# Patient Record
Sex: FEMALE | Race: WHITE | NOT HISPANIC OR LATINO | Employment: UNEMPLOYED | URBAN - METROPOLITAN AREA
[De-identification: names, ages, dates, MRNs, and addresses within clinical notes are randomized per-mention and may not be internally consistent; named-entity substitution may affect disease eponyms.]

---

## 2017-03-01 ENCOUNTER — AMBULATORY - HEALTHEAST (OUTPATIENT)
Dept: BEHAVIORAL HEALTH | Facility: CLINIC | Age: 42
End: 2017-03-01

## 2017-03-01 DIAGNOSIS — F32.89 DEPRESSIVE DISORDER, NOT ELSEWHERE CLASSIFIED: ICD-10-CM

## 2017-03-01 DIAGNOSIS — Z87.898 HISTORY OF DEVELOPMENTAL DELAY: ICD-10-CM

## 2017-03-13 ENCOUNTER — OFFICE VISIT - HEALTHEAST (OUTPATIENT)
Dept: BEHAVIORAL HEALTH | Facility: CLINIC | Age: 42
End: 2017-03-13

## 2017-03-13 DIAGNOSIS — R69 DIAGNOSIS DEFERRED: ICD-10-CM

## 2017-03-14 ENCOUNTER — COMMUNICATION - HEALTHEAST (OUTPATIENT)
Dept: BEHAVIORAL HEALTH | Facility: CLINIC | Age: 42
End: 2017-03-14

## 2017-03-17 ENCOUNTER — ANESTHESIA (OUTPATIENT)
Dept: SURGERY | Facility: CLINIC | Age: 42
End: 2017-03-17
Payer: MEDICARE

## 2017-03-17 ENCOUNTER — ANESTHESIA EVENT (OUTPATIENT)
Dept: SURGERY | Facility: CLINIC | Age: 42
End: 2017-03-17
Payer: MEDICARE

## 2017-03-17 ENCOUNTER — HOSPITAL ENCOUNTER (OUTPATIENT)
Facility: CLINIC | Age: 42
LOS: 1 days | Discharge: HOME | End: 2017-03-18
Attending: SURGERY | Admitting: SURGERY
Payer: MEDICARE

## 2017-03-17 DIAGNOSIS — K35.80 ACUTE APPENDICITIS, UNSPECIFIED ACUTE APPENDICITIS TYPE: Primary | ICD-10-CM

## 2017-03-17 LAB — HCG SERPL QL: NEGATIVE

## 2017-03-17 PROCEDURE — 88304 TISSUE EXAM BY PATHOLOGIST: CPT | Performed by: SURGERY

## 2017-03-17 PROCEDURE — 44970 LAPAROSCOPY APPENDECTOMY: CPT | Performed by: SURGERY

## 2017-03-17 PROCEDURE — 71000013 ZZH RECOVERY PHASE 1 LEVEL 1 EA ADDTL HR: Performed by: SURGERY

## 2017-03-17 PROCEDURE — 12000000 ZZH R&B MED SURG/OB

## 2017-03-17 PROCEDURE — 25000128 H RX IP 250 OP 636: Performed by: SURGERY

## 2017-03-17 PROCEDURE — 84703 CHORIONIC GONADOTROPIN ASSAY: CPT | Performed by: ANESTHESIOLOGY

## 2017-03-17 PROCEDURE — 25000128 H RX IP 250 OP 636: Performed by: NURSE ANESTHETIST, CERTIFIED REGISTERED

## 2017-03-17 PROCEDURE — 25000125 ZZHC RX 250: Performed by: NURSE ANESTHETIST, CERTIFIED REGISTERED

## 2017-03-17 PROCEDURE — 36000056 ZZH SURGERY LEVEL 3 1ST 30 MIN: Performed by: SURGERY

## 2017-03-17 PROCEDURE — 37000009 ZZH ANESTHESIA TECHNICAL FEE, EACH ADDTL 15 MIN: Performed by: SURGERY

## 2017-03-17 PROCEDURE — 25800025 ZZH RX 258: Performed by: NURSE ANESTHETIST, CERTIFIED REGISTERED

## 2017-03-17 PROCEDURE — 37000008 ZZH ANESTHESIA TECHNICAL FEE, 1ST 30 MIN: Performed by: SURGERY

## 2017-03-17 PROCEDURE — 25000125 ZZHC RX 250: Performed by: ANESTHESIOLOGY

## 2017-03-17 PROCEDURE — 27210794 ZZH OR GENERAL SUPPLY STERILE: Performed by: SURGERY

## 2017-03-17 PROCEDURE — 25800025 ZZH RX 258: Performed by: SURGERY

## 2017-03-17 PROCEDURE — 71000012 ZZH RECOVERY PHASE 1 LEVEL 1 FIRST HR: Performed by: SURGERY

## 2017-03-17 PROCEDURE — 36000058 ZZH SURGERY LEVEL 3 EA 15 ADDTL MIN: Performed by: SURGERY

## 2017-03-17 PROCEDURE — 99204 OFFICE O/P NEW MOD 45 MIN: CPT | Mod: 57 | Performed by: SURGERY

## 2017-03-17 PROCEDURE — 40000305 ZZH STATISTIC PRE PROC ASSESS I: Performed by: SURGERY

## 2017-03-17 PROCEDURE — 25000125 ZZHC RX 250: Performed by: SURGERY

## 2017-03-17 PROCEDURE — 25000566 ZZH SEVOFLURANE, EA 15 MIN: Performed by: SURGERY

## 2017-03-17 PROCEDURE — 88304 TISSUE EXAM BY PATHOLOGIST: CPT | Mod: 26 | Performed by: SURGERY

## 2017-03-17 PROCEDURE — 36415 COLL VENOUS BLD VENIPUNCTURE: CPT | Performed by: ANESTHESIOLOGY

## 2017-03-17 RX ORDER — PROPOFOL 10 MG/ML
INJECTION, EMULSION INTRAVENOUS PRN
Status: DISCONTINUED | OUTPATIENT
Start: 2017-03-17 | End: 2017-03-17

## 2017-03-17 RX ORDER — ERTAPENEM 1 G/1
1 INJECTION, POWDER, LYOPHILIZED, FOR SOLUTION INTRAMUSCULAR; INTRAVENOUS EVERY 24 HOURS
Status: DISCONTINUED | OUTPATIENT
Start: 2017-03-17 | End: 2017-03-17 | Stop reason: HOSPADM

## 2017-03-17 RX ORDER — NALOXONE HYDROCHLORIDE 0.4 MG/ML
.1-.4 INJECTION, SOLUTION INTRAMUSCULAR; INTRAVENOUS; SUBCUTANEOUS
Status: DISCONTINUED | OUTPATIENT
Start: 2017-03-17 | End: 2017-03-18

## 2017-03-17 RX ORDER — PRENATAL VIT/IRON FUM/FOLIC AC 27MG-0.8MG
1 TABLET ORAL DAILY
COMMUNITY

## 2017-03-17 RX ORDER — LIDOCAINE 40 MG/G
CREAM TOPICAL
Status: DISCONTINUED | OUTPATIENT
Start: 2017-03-17 | End: 2017-03-17 | Stop reason: HOSPADM

## 2017-03-17 RX ORDER — GLYCOPYRROLATE 0.2 MG/ML
INJECTION, SOLUTION INTRAMUSCULAR; INTRAVENOUS PRN
Status: DISCONTINUED | OUTPATIENT
Start: 2017-03-17 | End: 2017-03-17

## 2017-03-17 RX ORDER — SODIUM CHLORIDE, SODIUM LACTATE, POTASSIUM CHLORIDE, CALCIUM CHLORIDE 600; 310; 30; 20 MG/100ML; MG/100ML; MG/100ML; MG/100ML
INJECTION, SOLUTION INTRAVENOUS CONTINUOUS
Status: DISCONTINUED | OUTPATIENT
Start: 2017-03-17 | End: 2017-03-17 | Stop reason: HOSPADM

## 2017-03-17 RX ORDER — LIDOCAINE HYDROCHLORIDE 10 MG/ML
INJECTION, SOLUTION INFILTRATION; PERINEURAL PRN
Status: DISCONTINUED | OUTPATIENT
Start: 2017-03-17 | End: 2017-03-17

## 2017-03-17 RX ORDER — DEXAMETHASONE SODIUM PHOSPHATE 4 MG/ML
INJECTION, SOLUTION INTRA-ARTICULAR; INTRALESIONAL; INTRAMUSCULAR; INTRAVENOUS; SOFT TISSUE PRN
Status: DISCONTINUED | OUTPATIENT
Start: 2017-03-17 | End: 2017-03-17

## 2017-03-17 RX ORDER — NEOSTIGMINE METHYLSULFATE 1 MG/ML
VIAL (ML) INJECTION PRN
Status: DISCONTINUED | OUTPATIENT
Start: 2017-03-17 | End: 2017-03-17

## 2017-03-17 RX ORDER — FENTANYL CITRATE 50 UG/ML
INJECTION, SOLUTION INTRAMUSCULAR; INTRAVENOUS PRN
Status: DISCONTINUED | OUTPATIENT
Start: 2017-03-17 | End: 2017-03-17

## 2017-03-17 RX ORDER — SODIUM CHLORIDE, SODIUM LACTATE, POTASSIUM CHLORIDE, CALCIUM CHLORIDE 600; 310; 30; 20 MG/100ML; MG/100ML; MG/100ML; MG/100ML
INJECTION, SOLUTION INTRAVENOUS CONTINUOUS
Status: DISCONTINUED | OUTPATIENT
Start: 2017-03-17 | End: 2017-03-18 | Stop reason: HOSPADM

## 2017-03-17 RX ORDER — DROPERIDOL 2.5 MG/ML
0.62 INJECTION, SOLUTION INTRAMUSCULAR; INTRAVENOUS
Status: DISCONTINUED | OUTPATIENT
Start: 2017-03-17 | End: 2017-03-17 | Stop reason: RX

## 2017-03-17 RX ORDER — CHLORHEXIDINE GLUCONATE ORAL RINSE 1.2 MG/ML
15 SOLUTION DENTAL 2 TIMES DAILY
COMMUNITY

## 2017-03-17 RX ORDER — MEPERIDINE HYDROCHLORIDE 25 MG/ML
12.5 INJECTION INTRAMUSCULAR; INTRAVENOUS; SUBCUTANEOUS EVERY 5 MIN PRN
Status: DISCONTINUED | OUTPATIENT
Start: 2017-03-17 | End: 2017-03-18 | Stop reason: HOSPADM

## 2017-03-17 RX ORDER — DIMENHYDRINATE 50 MG/ML
25 INJECTION, SOLUTION INTRAMUSCULAR; INTRAVENOUS
Status: DISCONTINUED | OUTPATIENT
Start: 2017-03-17 | End: 2017-03-18 | Stop reason: HOSPADM

## 2017-03-17 RX ORDER — DEXAMETHASONE SODIUM PHOSPHATE 4 MG/ML
4 INJECTION, SOLUTION INTRA-ARTICULAR; INTRALESIONAL; INTRAMUSCULAR; INTRAVENOUS; SOFT TISSUE
Status: DISCONTINUED | OUTPATIENT
Start: 2017-03-17 | End: 2017-03-18 | Stop reason: HOSPADM

## 2017-03-17 RX ORDER — BUPIVACAINE HYDROCHLORIDE AND EPINEPHRINE 5; 5 MG/ML; UG/ML
INJECTION, SOLUTION PERINEURAL PRN
Status: DISCONTINUED | OUTPATIENT
Start: 2017-03-17 | End: 2017-03-18 | Stop reason: HOSPADM

## 2017-03-17 RX ORDER — FENTANYL CITRATE 50 UG/ML
25-50 INJECTION, SOLUTION INTRAMUSCULAR; INTRAVENOUS
Status: DISCONTINUED | OUTPATIENT
Start: 2017-03-17 | End: 2017-03-18 | Stop reason: HOSPADM

## 2017-03-17 RX ORDER — KETOROLAC TROMETHAMINE 30 MG/ML
30 INJECTION, SOLUTION INTRAMUSCULAR; INTRAVENOUS
Status: DISCONTINUED | OUTPATIENT
Start: 2017-03-17 | End: 2017-03-18 | Stop reason: HOSPADM

## 2017-03-17 RX ORDER — ONDANSETRON 2 MG/ML
4 INJECTION INTRAMUSCULAR; INTRAVENOUS EVERY 30 MIN PRN
Status: DISCONTINUED | OUTPATIENT
Start: 2017-03-17 | End: 2017-03-18 | Stop reason: HOSPADM

## 2017-03-17 RX ORDER — METOCLOPRAMIDE HYDROCHLORIDE 5 MG/ML
10 INJECTION INTRAMUSCULAR; INTRAVENOUS EVERY 6 HOURS PRN
Status: DISCONTINUED | OUTPATIENT
Start: 2017-03-17 | End: 2017-03-18 | Stop reason: HOSPADM

## 2017-03-17 RX ORDER — SODIUM CHLORIDE, SODIUM LACTATE, POTASSIUM CHLORIDE, CALCIUM CHLORIDE 600; 310; 30; 20 MG/100ML; MG/100ML; MG/100ML; MG/100ML
INJECTION, SOLUTION INTRAVENOUS CONTINUOUS PRN
Status: DISCONTINUED | OUTPATIENT
Start: 2017-03-17 | End: 2017-03-17

## 2017-03-17 RX ORDER — HYDROMORPHONE HYDROCHLORIDE 1 MG/ML
.3-.5 INJECTION, SOLUTION INTRAMUSCULAR; INTRAVENOUS; SUBCUTANEOUS EVERY 5 MIN PRN
Status: DISCONTINUED | OUTPATIENT
Start: 2017-03-17 | End: 2017-03-18 | Stop reason: HOSPADM

## 2017-03-17 RX ORDER — METOCLOPRAMIDE 10 MG/1
10 TABLET ORAL EVERY 6 HOURS PRN
Status: DISCONTINUED | OUTPATIENT
Start: 2017-03-17 | End: 2017-03-18 | Stop reason: HOSPADM

## 2017-03-17 RX ORDER — HYDRALAZINE HYDROCHLORIDE 20 MG/ML
2.5-5 INJECTION INTRAMUSCULAR; INTRAVENOUS EVERY 10 MIN PRN
Status: DISCONTINUED | OUTPATIENT
Start: 2017-03-17 | End: 2017-03-18 | Stop reason: HOSPADM

## 2017-03-17 RX ORDER — ONDANSETRON 2 MG/ML
INJECTION INTRAMUSCULAR; INTRAVENOUS PRN
Status: DISCONTINUED | OUTPATIENT
Start: 2017-03-17 | End: 2017-03-17

## 2017-03-17 RX ORDER — LABETALOL HYDROCHLORIDE 5 MG/ML
INJECTION, SOLUTION INTRAVENOUS PRN
Status: DISCONTINUED | OUTPATIENT
Start: 2017-03-17 | End: 2017-03-17

## 2017-03-17 RX ORDER — ONDANSETRON 4 MG/1
4 TABLET, ORALLY DISINTEGRATING ORAL EVERY 30 MIN PRN
Status: DISCONTINUED | OUTPATIENT
Start: 2017-03-17 | End: 2017-03-18 | Stop reason: HOSPADM

## 2017-03-17 RX ADMIN — Medication 3 MG: at 22:33

## 2017-03-17 RX ADMIN — SODIUM CHLORIDE, POTASSIUM CHLORIDE, SODIUM LACTATE AND CALCIUM CHLORIDE: 600; 310; 30; 20 INJECTION, SOLUTION INTRAVENOUS at 21:40

## 2017-03-17 RX ADMIN — ROCURONIUM BROMIDE 50 MG: 10 INJECTION INTRAVENOUS at 21:45

## 2017-03-17 RX ADMIN — PROPOFOL 200 MG: 10 INJECTION, EMULSION INTRAVENOUS at 21:43

## 2017-03-17 RX ADMIN — GLYCOPYRROLATE 0.6 MG: 0.2 INJECTION, SOLUTION INTRAMUSCULAR; INTRAVENOUS at 22:33

## 2017-03-17 RX ADMIN — GLYCOPYRROLATE 0.2 MG: 0.2 INJECTION, SOLUTION INTRAMUSCULAR; INTRAVENOUS at 21:42

## 2017-03-17 RX ADMIN — LABETALOL HYDROCHLORIDE 7.5 MG: 5 INJECTION, SOLUTION INTRAVENOUS at 22:12

## 2017-03-17 RX ADMIN — ERTAPENEM SODIUM 1 G: 1 INJECTION, POWDER, LYOPHILIZED, FOR SOLUTION INTRAMUSCULAR; INTRAVENOUS at 21:15

## 2017-03-17 RX ADMIN — FENTANYL CITRATE 50 MCG: 50 INJECTION INTRAMUSCULAR; INTRAVENOUS at 23:48

## 2017-03-17 RX ADMIN — SODIUM CHLORIDE, POTASSIUM CHLORIDE, SODIUM LACTATE AND CALCIUM CHLORIDE: 600; 310; 30; 20 INJECTION, SOLUTION INTRAVENOUS at 22:15

## 2017-03-17 RX ADMIN — FENTANYL CITRATE 50 MCG: 50 INJECTION INTRAMUSCULAR; INTRAVENOUS at 23:38

## 2017-03-17 RX ADMIN — ONDANSETRON 4 MG: 2 INJECTION INTRAMUSCULAR; INTRAVENOUS at 21:58

## 2017-03-17 RX ADMIN — DEXAMETHASONE SODIUM PHOSPHATE 4 MG: 4 INJECTION, SOLUTION INTRAMUSCULAR; INTRAVENOUS at 21:46

## 2017-03-17 RX ADMIN — LIDOCAINE HYDROCHLORIDE 50 MG: 10 INJECTION, SOLUTION INFILTRATION; PERINEURAL at 21:43

## 2017-03-17 RX ADMIN — FENTANYL CITRATE 100 MCG: 50 INJECTION, SOLUTION INTRAMUSCULAR; INTRAVENOUS at 21:41

## 2017-03-17 RX ADMIN — FENTANYL CITRATE 50 MCG: 50 INJECTION INTRAMUSCULAR; INTRAVENOUS at 21:39

## 2017-03-17 NOTE — IP AVS SNAPSHOT
MRN:[id number]                      After Visit Summary   3/17/2017    Abelino Choi    MRN: [id number]           Thank you!     Thank you for choosing [facility] for your care. Our goal is always to provide you with excellent care. Hearing back from our patients is one way we can continue to improve our services. Please take a few minutes to complete the written survey that you may receive in the mail after you visit. If you would like to speak to someone directly about your visit please contact Patient Relations at [phone]. Thank you!          Patient Information     Date Of Birth          1975        About your hospital stay     You were admitted on:  March 17, 2017 You last received care in the:  [facility]    You were discharged on:  March 18, 2017       Who to Call     For medical emergencies, please call 911.  For non-urgent questions about your medical care, please call your primary care provider or clinic, None  For questions related to your surgery, please call your surgery clinic        Attending Provider     Provider Samson Hearn MD Surgery       Primary Care Provider    Physician No Ref-Primary       No address on file        After Care Instructions     Ice to affected area       Ice to operative site PRN                  Further instructions from your care team       HOME CARE FOLLOWING APPENDECTOMY  DAVID Gifford, ISAAC Woodward, CATALINO Herndon, BECKI Avilez    INCISIONAL CARE:  Replace the bandage over your incision (or incisions) until all drainage stops, or if more comfortable to have in place.  If present, leave the steri-strips (white paper tapes) in place till they fall off.  If you have staples in your incision at the time of discharge, they will be removed at your follow-up appointment.  If Dermabond (a type of skin glue) is present, leave in place until it wears/flakes off.      BATHING:  Avoid baths for 1 week after surgery.  Showers are okay.  You may wash your hair at any time.  Gently pat your incision dry after bathing.    ACTIVITY:  Light Activity -- you may immediately be up and about as tolerated.  Driving -- you may drive when comfortable and off narcotic pain medications.  Light Work -- resume when comfortable off pain medications.  (If you can drive, you probably can work.)  Strenuous Work/Activity -- limit lifting to 20 pounds for 1 week.  Progressively increase with time.  Active Sports (running, biking, etc.) -- cautiously resume after 2 weeks.    DISCOMFORT:  Use pain medications as prescribed by your surgeon.  Take the pain medication with some food, when possible, to minimize side effects.  Intermittent use of ice packs at the incision sites may help during the first 48 hours.  Expect gradual improvement.    DIET:  No restrictions.  Drink plenty of fluids.  While taking pain medications, increase dietary fiber or add a fiber supplementation like Metamucil or Citrucel to help prevent constipation - a possible side effect of pain medications.    NAUSEA:  If nauseated from the anesthetic/pain meds; rest in bed, get up cautiously with assistance, and drink clear liquids (juice, tea, broth).    RETURN APPOINTMENT:  Schedule a follow-up visit 1-3 weeks post-op.  Office Phone:  [phone]     CONTACT US IF THE FOLLOWING DEVELOPS:   1. A fever that is above 101     2. If there is a large amount of drainage, bleeding, or swelling.   3. Severe pain that is not relieved by your prescription.   4. Drainage that is thick, cloudy, yellow, green or white.   5. Any other questions not answered by  Frequently Asked Questions  sheet.      FREQUENTLY ASKED QUESTIONS:    Q:  How should my incision look?    A:  Normally your incision will appear slightly swollen with light redness directly along the incision itself as it heals.  It may feel like a bump or ridge as the healing/scarring  happens, and over time (3-4 months) this bump or ridge feeling should slowly go away.  In general, clear or pink watery drainage can be normal at first as your incision heals, but should decrease over time.    Q:  How do I know if my incision is infected?  A:  Look at your incision for signs of infection, like redness around the incision spreading to surrounding skin, or drainage of cloudy or foul-smelling drainage.  If you feel warm, check your temperature to see if you are running a fever.    **If any of these things occur, please notify the nurse at our office.  We may need you to come into the office for an incision check.      Q:  How do I take care of my incision?  A:  If you have a dressing in place - Starting the day after surgery, replace the dressing 1-2 times a day until there is no further drainage from the incision.  At that time, a dressing is no longer needed.  Try to minimize tape on the skin if irritation is occurring at the tape sites.  If you have significant irritation from tape on the skin, please call the office to discuss other method of dressing your incision.    Small pieces of tape called  steri-strips  may be present directly overlying your incision; these may be removed 10 days after surgery unless otherwise specified by your surgeon.  If these tapes start to loosen at the ends, you may trim them back until they fall off or are removed.    A:  If you had  Dermabond  tissue glue used as a dressing (this causes your incision to look shiny with a clear covering over it) - This type of dressing wears off with time and does not require more dressings over the top unless it is draining around the glue as it wears off.  Do not apply ointments or lotions over the incisions until the glue has completely worn off.    Q:  There is a piece of tape or a sticky  lead  still on my skin.  Can I remove this?  A:  Sometimes the sticky  leads  used for monitoring during surgery or for evaluation in the  emergency department are not all removed while you are in the hospital.  These sometimes have a tab or metal dot on them.  You can easily remove these on your own, like taking off a band-aid.  If there is a gel substance under the  lead , simply wipe/clean it off with a washcloth or paper towel.      Q:  What can I do to minimize constipation (very hard stools, or lack of stools)?  A:  Stay well hydrated.  Increase your dietary fiber intake or take a fiber supplement -with plenty of water.  Walk around frequently.  You may consider an over-the-counter stool-softener.  Your Pharmacist can assist you with choosing one that is stocked at your pharmacy.  Constipation is also one of the most common side effects of pain medication.  If you are using pain medication, be pro-active and try to PREVENT problems with constipation by taking the steps above BEFORE constipation becomes a problem.    Q:  What do I do if I need more pain medications?  A:  Call the office to receive refills.  Be aware that certain pain meds cannot be called into a pharmacy and actually require a paper prescription.  A change may be made in your pain med as you progress thru your recovery period or if you have side effects to certain meds.    --Pain meds are NOT refilled after 5pm on weekdays, and NOT AT ALL on the weekends, so please look ahead to prevent problems.      Q:  Why am I having a hard time sleeping now that I am at home?  A:  Many medications you receive while you are in the hospital can impact your sleep for a number of days after your surgery/hospitalization.  Decreased level of activity and naps during the day may also make sleeping at night difficult.  Try to minimize day-time naps, and get up frequently during the day to walk around your home during your recovery time.  Sleep aides may be of some help, but are not recommended for long-term use.      Q:  I am having some back discomfort.  What should I do?  A:  This may be related to  certain positioning that was required for your surgery, extended periods of time in bed, or other changes in your overall activity level.  You may try ice, heat, acetaminophen, or ibuprofen to treat this temporarily.  Note that many pain medications have acetaminophen in them and would state this on the prescription bottle.  Be sure not to exceed the maximum of 4000mg per day of acetaminophen.     **If the pain you are having does not resolve, is severe, or is a flare of back pain you have had on other occasions prior to surgery, please contact your primary physician for further recommendations or for an appointment to be examined at their office.    Q:  Why am I having headaches?  A:  Headaches can be caused by many things:  caffeine withdrawal, use of pain meds, dehydration, high blood pressure, lack of sleep, over-activity/exhaustion, flare-up of usual migraine headaches.  If you feel this is related to muscle tension (a band-like feeling around the head, or a pressure at the low-back of the head) you may try ice or heat to this area.  You may need to drink more fluids (try electrolyte drink like Gatorade), rest, or take your usual migraine medications.   **If your headaches do not resolve, worsen, are accompanied by other symptoms, or if your blood pressure is high, please call your primary physician for recommendation and/or examination.    Q:  I am unable to urinate.  What do I do?  A:  A small percentage of people can have difficulty urinating initially after surgery.  This includes being able to urinate only a very small amount at a time and feeling discomfort or pressure in the very low abdomen.  This is called  urinary retention , and is actually an urgent situation.  Proceed to your nearest Emergency department for evaluation (not an Urgent Care Center).  Sometimes the bladder does not work correctly after certain medications you receive during surgery, or related to certain procedures.  You may need to  "have a catheter placed until your bladder recovers.  When planning to go to an Emergency department, it may help to call the ER to let them know you are coming in for this problem after a surgery.  This may help you get in quicker to be evaluated.  **If you have symptoms of a urinary tract infection, please contact your primary physician for the proper evaluation and treatment.          If you have other questions, please call the office Monday thru Friday between 8am and 5pm to discuss with the nurse or physician assistant.  [phone]    There is a surgeon ON CALL on weekday evenings and over the weekend in case of urgent need only, and may be contacted at the same number.    If you are having an emergency, call 911 or proceed to your nearest emergency department.            Pending Results     Date and Time Order Name Status Description    3/17/2017 2230 Surgical pathology exam In process             Statement of Approval     Ordered          03/18/17 9079  I have reviewed and agree with all the recommendations and orders detailed in this document.  EFFECTIVE NOW     Approved and electronically signed by:  Mona Thompson PA-C             Admission Information     Date & Time Provider Department Dept. Phone    3/17/2017 Samson Flaherty MD [facility] [phone]      Your Vitals Were     Blood Pressure Temperature Respirations Height Weight Last Period    105/54 (BP Location: Right arm) 98.9  F (37.2  C) (Oral) 16 1.626 m (5' 4\") 74.3 kg (163 lb 12.8 oz) 03/17/2017    Pulse Oximetry BMI (Body Mass Index)                94% 28.12 kg/m2          Locationary Information     Locationary lets you send messages to your doctor, view your test results, renew your prescriptions, schedule appointments and more. To sign up, go to [email or web address] . Click on \"Log in\" on the left side of the screen, which will take you to the Welcome page. Then click on \"Sign up Now\" on the right side of " the page.     You will be asked to enter the access code listed below, as well as some personal information. Please follow the directions to create your username and password.     Your access code is: [email or web address]  Expires: 2017  6:17 PM     Your access code will  in 90 days. If you need help or a new code, please call your [facility] clinic or [phone].        Care EveryWhere ID     This is your Care EveryWhere ID. This could be used by other organizations to access your [facility] medical records  [id number]           Review of your medicines      UNREVIEWED medicines. Ask your doctor about these medicines        Dose / Directions    calcium carb 1250 mg (500 mg [place])/vitamin D 200 units 500-200 MG-UNIT per tablet   Commonly known as:  OSCAL with D        Dose:  2 tablet   Take 2 tablets by mouth daily   Refills:  0       chlorhexidine 0.12 % solution   Commonly known as:  PERIDEX        Dose:  15 mL   Swish and spit 15 mLs in mouth 2 times daily   Refills:  0       prenatal multivitamin  plus iron 27-0.8 MG Tabs per tablet        Dose:  1 tablet   Take 1 tablet by mouth daily   Refills:  0       SERTRALINE HCL PO        Dose:  50 mg   Take 50 mg by mouth daily   Refills:  0         START taking        Dose / Directions    oxyCODONE-acetaminophen 5-325 MG per tablet   Commonly known as:  PERCOCET   Used for:  Acute appendicitis, unspecified acute appendicitis type        Dose:  1-2 tablet   Take 1-2 tablets by mouth every 4 hours as needed for pain (moderate to severe)   Quantity:  30 tablet   Refills:  0       * senna-docusate 8.6-50 MG per tablet   Commonly known as:  SENOKOT S   Used for:  Acute appendicitis, unspecified acute appendicitis type        Dose:  1 tablet   Take 1 tablet by mouth 2 times daily as needed for constipation   Quantity:  50 tablet   Refills:  0       * senna-docusate 8.6-50 MG per tablet   Commonly known as:  SENOKOT S   Used for:  Acute appendicitis, unspecified acute  appendicitis type        Dose:  1 tablet   Take 1 tablet by mouth 2 times daily as needed for constipation   Quantity:  50 tablet   Refills:  0       * Notice:  This list has 2 medication(s) that are the same as other medications prescribed for you. Read the directions carefully, and ask your doctor or other care provider to review them with you.         Where to get your medicines      These medications were sent to [place], [place] - [address] [facility]  [address] [facility] [address]     Phone:  [phone]     senna-docusate 8.6-50 MG per tablet    senna-docusate 8.6-50 MG per tablet         Some of these will need a paper prescription and others can be bought over the counter. Ask your nurse if you have questions.     Bring a paper prescription for each of these medications     oxyCODONE-acetaminophen 5-325 MG per tablet                Protect others around you: Learn how to safely use, store and throw away your medicines at www.disposemymeds.org.             Medication List: This is a list of all your medications and when to take them. Check marks below indicate your daily home schedule. Keep this list as a reference.      Medications           Morning Afternoon Evening Bedtime As Needed    calcium carb 1250 mg (500 mg [place])/vitamin D 200 units 500-200 MG-UNIT per tablet   Commonly known as:  OSCAL with D   Take 2 tablets by mouth daily   Last time this was given:  2 tablets on 3/18/2017  9:20 AM                                chlorhexidine 0.12 % solution   Commonly known as:  PERIDEX   Swish and spit 15 mLs in mouth 2 times daily   Last time this was given:  15 mLs on 3/18/2017  5:56 PM                                oxyCODONE-acetaminophen 5-325 MG per tablet   Commonly known as:  PERCOCET   Take 1-2 tablets by mouth every 4 hours as needed for pain (moderate to severe)   Last time this was given:  2 tablets on 3/18/2017  5:56 PM                                 prenatal multivitamin  plus iron 27-0.8 MG Tabs per tablet   Take 1 tablet by mouth daily   Last time this was given:  1 tablet on 3/18/2017  9:23 AM                                * senna-docusate 8.6-50 MG per tablet   Commonly known as:  SENOKOT S   Take 1 tablet by mouth 2 times daily as needed for constipation                                * senna-docusate 8.6-50 MG per tablet   Commonly known as:  SENOKOT S   Take 1 tablet by mouth 2 times daily as needed for constipation                                SERTRALINE HCL PO   Take 50 mg by mouth daily   Last time this was given:  50 mg on 3/18/2017  9:19 AM                                * Notice:  This list has 2 medication(s) that are the same as other medications prescribed for you. Read the directions carefully, and ask your doctor or other care provider to review them with you.

## 2017-03-17 NOTE — IP AVS SNAPSHOT
[facility]    [address] [facility] [address]    Phone:  [phone]    Fax:  [phone]                                       After Visit Summary   3/17/2017    Abelino Choi    MRN: [id number]           After Visit Summary Signature Page     I have received my discharge instructions, and my questions have been answered. I have discussed any challenges I see with this plan with the nurse or doctor.    ..........................................................................................................................................  Patient/Patient Representative Signature      ..........................................................................................................................................  Patient Representative Print Name and Relationship to Patient    ..................................................               ................................................  Date                                            Time    ..........................................................................................................................................  Reviewed by Signature/Title    ...................................................              ..............................................  Date                                                            Time

## 2017-03-18 VITALS
SYSTOLIC BLOOD PRESSURE: 105 MMHG | BODY MASS INDEX: 27.96 KG/M2 | OXYGEN SATURATION: 94 % | WEIGHT: 163.8 LBS | RESPIRATION RATE: 16 BRPM | DIASTOLIC BLOOD PRESSURE: 54 MMHG | HEIGHT: 64 IN | TEMPERATURE: 98.9 F

## 2017-03-18 PROBLEM — K37 APPENDICITIS: Status: ACTIVE | Noted: 2017-03-18

## 2017-03-18 PROCEDURE — 25000125 ZZHC RX 250: Performed by: SURGERY

## 2017-03-18 PROCEDURE — A9270 NON-COVERED ITEM OR SERVICE: HCPCS | Mod: GY | Performed by: SURGERY

## 2017-03-18 PROCEDURE — 25000132 ZZH RX MED GY IP 250 OP 250 PS 637: Mod: GY | Performed by: SURGERY

## 2017-03-18 PROCEDURE — 25000128 H RX IP 250 OP 636: Performed by: SURGERY

## 2017-03-18 RX ORDER — ONDANSETRON 2 MG/ML
4 INJECTION INTRAMUSCULAR; INTRAVENOUS EVERY 6 HOURS PRN
Status: DISCONTINUED | OUTPATIENT
Start: 2017-03-18 | End: 2017-03-18 | Stop reason: HOSPADM

## 2017-03-18 RX ORDER — ALBUTEROL SULFATE 90 UG/1
2 AEROSOL, METERED RESPIRATORY (INHALATION) EVERY 4 HOURS PRN
Status: DISCONTINUED | OUTPATIENT
Start: 2017-03-18 | End: 2017-03-18 | Stop reason: HOSPADM

## 2017-03-18 RX ORDER — AMOXICILLIN 250 MG
1 CAPSULE ORAL 2 TIMES DAILY PRN
Qty: 50 TABLET | Refills: 0 | Status: SHIPPED | OUTPATIENT
Start: 2017-03-18

## 2017-03-18 RX ORDER — PROCHLORPERAZINE MALEATE 5 MG
5-10 TABLET ORAL EVERY 6 HOURS PRN
Status: DISCONTINUED | OUTPATIENT
Start: 2017-03-18 | End: 2017-03-18 | Stop reason: HOSPADM

## 2017-03-18 RX ORDER — OXYCODONE AND ACETAMINOPHEN 5; 325 MG/1; MG/1
1-2 TABLET ORAL EVERY 4 HOURS PRN
Qty: 30 TABLET | Refills: 0 | Status: SHIPPED | OUTPATIENT
Start: 2017-03-18

## 2017-03-18 RX ORDER — PRENATAL VIT/IRON FUM/FOLIC AC 27MG-0.8MG
1 TABLET ORAL DAILY
Status: DISCONTINUED | OUTPATIENT
Start: 2017-03-18 | End: 2017-03-18 | Stop reason: HOSPADM

## 2017-03-18 RX ORDER — CHLORHEXIDINE GLUCONATE ORAL RINSE 1.2 MG/ML
15 SOLUTION DENTAL 2 TIMES DAILY
Status: DISCONTINUED | OUTPATIENT
Start: 2017-03-18 | End: 2017-03-18 | Stop reason: HOSPADM

## 2017-03-18 RX ORDER — ONDANSETRON 4 MG/1
4 TABLET, ORALLY DISINTEGRATING ORAL EVERY 6 HOURS PRN
Status: DISCONTINUED | OUTPATIENT
Start: 2017-03-18 | End: 2017-03-18 | Stop reason: HOSPADM

## 2017-03-18 RX ORDER — OXYCODONE AND ACETAMINOPHEN 5; 325 MG/1; MG/1
1-2 TABLET ORAL EVERY 4 HOURS PRN
Status: DISCONTINUED | OUTPATIENT
Start: 2017-03-18 | End: 2017-03-18 | Stop reason: HOSPADM

## 2017-03-18 RX ORDER — NALOXONE HYDROCHLORIDE 0.4 MG/ML
.1-.4 INJECTION, SOLUTION INTRAMUSCULAR; INTRAVENOUS; SUBCUTANEOUS
Status: DISCONTINUED | OUTPATIENT
Start: 2017-03-18 | End: 2017-03-18 | Stop reason: HOSPADM

## 2017-03-18 RX ORDER — OXYCODONE AND ACETAMINOPHEN 5; 325 MG/1; MG/1
1-2 TABLET ORAL EVERY 4 HOURS PRN
Qty: 20 TABLET | Refills: 0 | Status: SHIPPED | OUTPATIENT
Start: 2017-03-18 | End: 2017-03-18

## 2017-03-18 RX ORDER — HYDROMORPHONE HYDROCHLORIDE 1 MG/ML
.3-.5 INJECTION, SOLUTION INTRAMUSCULAR; INTRAVENOUS; SUBCUTANEOUS
Status: DISCONTINUED | OUTPATIENT
Start: 2017-03-18 | End: 2017-03-18 | Stop reason: HOSPADM

## 2017-03-18 RX ADMIN — Medication 2 LOZENGE: at 04:10

## 2017-03-18 RX ADMIN — CHLORHEXIDINE GLUCONATE 15 ML: 1.2 RINSE ORAL at 17:56

## 2017-03-18 RX ADMIN — HYDROMORPHONE HYDROCHLORIDE 0.5 MG: 1 INJECTION, SOLUTION INTRAMUSCULAR; INTRAVENOUS; SUBCUTANEOUS at 03:41

## 2017-03-18 RX ADMIN — HYDROMORPHONE HYDROCHLORIDE 0.5 MG: 1 INJECTION, SOLUTION INTRAMUSCULAR; INTRAVENOUS; SUBCUTANEOUS at 07:11

## 2017-03-18 RX ADMIN — OYSTER SHELL CALCIUM WITH VITAMIN D 2 TABLET: 500; 200 TABLET, FILM COATED ORAL at 09:20

## 2017-03-18 RX ADMIN — ALBUTEROL SULFATE 2 PUFF: 90 AEROSOL, METERED RESPIRATORY (INHALATION) at 04:48

## 2017-03-18 RX ADMIN — HYDROMORPHONE HYDROCHLORIDE 0.5 MG: 1 INJECTION, SOLUTION INTRAMUSCULAR; INTRAVENOUS; SUBCUTANEOUS at 01:25

## 2017-03-18 RX ADMIN — ONDANSETRON 4 MG: 2 INJECTION INTRAMUSCULAR; INTRAVENOUS at 01:31

## 2017-03-18 RX ADMIN — OXYCODONE HYDROCHLORIDE AND ACETAMINOPHEN 2 TABLET: 5; 325 TABLET ORAL at 09:21

## 2017-03-18 RX ADMIN — SERTRALINE HYDROCHLORIDE 50 MG: 50 TABLET, FILM COATED ORAL at 09:19

## 2017-03-18 RX ADMIN — PROCHLORPERAZINE EDISYLATE 5 MG: 5 INJECTION INTRAMUSCULAR; INTRAVENOUS at 03:54

## 2017-03-18 RX ADMIN — PRENATAL VIT W/ FE FUMARATE-FA TAB 27-0.8 MG 1 TABLET: 27-0.8 TAB at 09:23

## 2017-03-18 RX ADMIN — ONDANSETRON 4 MG: 4 TABLET, ORALLY DISINTEGRATING ORAL at 09:21

## 2017-03-18 RX ADMIN — OXYCODONE HYDROCHLORIDE AND ACETAMINOPHEN 2 TABLET: 5; 325 TABLET ORAL at 13:59

## 2017-03-18 RX ADMIN — OXYCODONE HYDROCHLORIDE AND ACETAMINOPHEN 2 TABLET: 5; 325 TABLET ORAL at 17:56

## 2017-03-18 ASSESSMENT — ACTIVITIES OF DAILY LIVING (ADL)
TOILETING: 0-->INDEPENDENT
BATHING: 0-->INDEPENDENT
FALL_HISTORY_WITHIN_LAST_SIX_MONTHS: NO
AMBULATION: 0-->INDEPENDENT
RETIRED_COMMUNICATION: 0-->UNDERSTANDS/COMMUNICATES WITHOUT DIFFICULTY
COGNITION: 0 - NO COGNITION ISSUES REPORTED
SWALLOWING: 0-->SWALLOWS FOODS/LIQUIDS WITHOUT DIFFICULTY
DRESS: 0-->INDEPENDENT
TRANSFERRING: 0-->INDEPENDENT
RETIRED_EATING: 0-->INDEPENDENT

## 2017-03-18 NOTE — BRIEF OP NOTE
[facility] Brief Operative Note    Pre-operative diagnosis: appendix   Post-operative diagnosis Acute appendicitis     Procedure: Procedure(s):  LAPAROSCOPIC APPENDECTOMY  - Wound Class: III-Contaminated   Surgeon(s): Surgeon(s) and Role:     * Samson Flaherty MD - Primary   Estimated blood loss: 5    Specimens:   ID Type Source Tests Collected by Time Destination   A : appendix Tissue Appendix SURGICAL PATHOLOGY EXAM Samson Flaherty MD 3/17/2017 10:21 PM       Findings: Acute nonruptured appendicitis and umbilical hernia which was dissected and used for trocar site.

## 2017-03-18 NOTE — PLAN OF CARE
Problem: Goal Outcome Summary  Goal: Goal Outcome Summary  Outcome: Improving  Pt is tolerating diet, pain is controlled with po meds.  Pt is voiding and does have her menses.  pts abdomen is tender.  Pt is planning to discharge on PM shift.  Her crisis home will provide a ride.

## 2017-03-18 NOTE — PLAN OF CARE
Problem: Goal Outcome Summary  Goal: Goal Outcome Summary  Outcome: No Change  Arrived to room 646 from PACU at 0014 via cart, alert and oriented x 4, oriented to room and call system, IV patent and infusing, ambulated to BR w/SBA to void, rates pain 7-8/10, reviewed welcome folder and pain/medication information packet with patient. Admission profile completed. Abdomen rounded, no bowel sounds. Steri strips/bandaids x 3- dry/intact. C/o nausea but requesting sandwiches and asking when she can order breakfast. Taking po liquids well. Pt taking IV Dilaudid every 2-3 hours. Lungs coarse, pt requested inhaler for wheezing r/t smoking. No wheezing noted. Inhaler ordered and self administered. Pt has numerous requests for various things, very needy. Pleasant and appreciative.

## 2017-03-18 NOTE — ANESTHESIA POSTPROCEDURE EVALUATION
Patient: Abelino Choi    Procedure(s):  LAPAROSCOPIC APPENDECTOMY  - Wound Class: III-Contaminated    Diagnosis:appendix  Diagnosis Additional Information: appendicitis    Anesthesia Type:  General, ETT    Note:  Anesthesia Post Evaluation    Patient location during evaluation: PACU  Patient participation: Able to fully participate in evaluation  Level of consciousness: awake and alert  Pain management: adequate  Airway patency: patent  Cardiovascular status: acceptable  Respiratory status: acceptable  Hydration status: acceptable  PONV: controlled     Anesthetic complications: None          Last vitals:  Vitals:    03/17/17 2034   BP: (!) 191/115   Temp: 97.7  F (36.5  C)   SpO2: 93%         Electronically Signed By: Musa Garcia MD  March 17, 2017  10:58 PM

## 2017-03-18 NOTE — OR NURSING
Pt here from group home.  Contact information as follows.[facility] [phone],  [place], [phone], [place] [phone].

## 2017-03-18 NOTE — PHARMACY-ADMISSION MEDICATION HISTORY
Admission medication history interview status for this patient is complete. See [facility] admission navigator for allergy information, prior to admission medications and immunization status.     Medication history interview source(s):Patient  Medication history resources (including written lists, pill bottles, clinic record):None  [facility]    Changes made to PTA medication list:  Added: none  Deleted: none  Changed: none    Actions taken by pharmacist (provider contacted, etc):None     Additional medication history information:None    Medication reconciliation/reorder completed by provider prior to medication history? Yes        Prior to Admission medications    Medication Sig Last Dose Taking? Auth Provider   Prenatal Vit-Fe Fumarate-FA (PRENATAL MULTIVITAMIN  PLUS IRON) 27-0.8 MG TABS per tablet Take 1 tablet by mouth daily 3/17/2017 at am Yes Reported, Patient   SERTRALINE HCL PO Take 50 mg by mouth daily 3/17/2017 at am Yes Reported, Patient   calcium carb 1250 mg, 500 mg [place],/vitamin D 200 units (OSCAL WITH D) 500-200 MG-UNIT per tablet Take 2 tablets by mouth daily 3/17/2017 at am Yes Reported, Patient   chlorhexidine (PERIDEX) 0.12 % solution Swish and spit 15 mLs in mouth 2 times daily Past Week at Unknown time Yes Reported, Patient

## 2017-03-18 NOTE — ANESTHESIA PREPROCEDURE EVALUATION
Anesthesia Evaluation     . Pt has had prior anesthetic. Type: General and Regional      ROS/MED HX    ENT/Pulmonary:  - neg pulmonary ROS     Neurologic:  - neg neurologic ROS     Cardiovascular:  - neg cardiovascular ROS       METS/Exercise Tolerance:     Hematologic:  - neg hematologic  ROS       Musculoskeletal:  - neg musculoskeletal ROS       GI/Hepatic:     (+) appendicitis,       Renal/Genitourinary:  - ROS Renal section negative       Endo:  - neg endo ROS       Psychiatric:  - neg psychiatric ROS       Infectious Disease:  - neg infectious disease ROS       Malignancy:      - no malignancy   Other:    (+) No chance of pregnancy C-spine cleared: N/A, no H/O Chronic Pain,no other significant disability              Physical Exam  Normal systems: cardiovascular, pulmonary and dental    Airway   Mallampati: I  TM distance: >3 FB  Neck ROM: full    Dental     Cardiovascular       Pulmonary                     Anesthesia Plan      History & Physical Review  History and physical reviewed and following examination; no interval change.    ASA Status:  1 .    NPO Status:  > 8 hours    Plan for General and ETT with Intravenous induction. Maintenance will be Balanced.    PONV prophylaxis:  Ondansetron (or other 5HT-3) and Dexamethasone or Solumedrol       Postoperative Care  Postoperative pain management:  IV analgesics.      Consents  Anesthetic plan, risks, benefits and alternatives discussed with:  Patient.  Use of blood products discussed: Yes.   Use of blood products discussed with Patient.  Consented to blood products.  .                          .

## 2017-03-18 NOTE — OP NOTE
-   DATE OF SERVICE: 3/17/2017     SURGEON  RASTA BILLINGSLEY MD     ASSISTANT   See procedure record.    PREPROCEDURE DIAGNOSIS   Acute appendicitis.    POSTPROCEDURE DIAGNOSIS   Acute appendicitis.    PROCEDURE   Laparoscopic appendectomy.     ESTIMATED BLOOD LOSS   5.     COMPLICATIONS   None.     SPECIMENS   Appendix.    FINDINGS  Acute nonruptured appendicitis and umbilical hernia which was dissected and used for trocar site.    INDICATIONS AND DESCRIPTION OF THE PROCEDURE   This is a 42 year old female with acute appendicitis. A laparoscopic appendectomy was therefore offered to the patient after a full discussion of risks, benefits, and alternatives. Informed consent was obtained. The patient was taken to the operating room and placed supine on the operating room table. General anesthesia was induced. The patient's operative site was prepped and draped in the usual sterile fashion. Appropriate perioperative antibiotics were administered. After anesthetizing the supraumbilical skin using 0.5% bupivacaine with epinephrine, a sharp incision was made using a scalpel and was carried down to fascia with sharp as well as blunt dissection. There was a hernia sac that was encircled and then opened to verify it was empty. It was then transected at the fascial level. 2 stay sutures of 0 Vicryl were placed. A 12 mm Rey trocar was then placed. Under laparoscopic vision, and after anesthetizing the skin, a 5 mm suprapubic trocar and a 5 mm left lower quadrant trocar were also placed. The abdomen was inspected laparoscopically. Acute appendicitis was seen. No purulence was seen.  There were no signs of rupture. The appendix was grasped and placed on tension, with adhesions around the appendix divided bluntly as well as sharply. A window was made in the mesoappendix close to the junction with the cecum, and through this the appendix is divided flush with the cecum using a single firing of an Endo DONALD tan load stapler.  An Endo DONALD tan load cartridge was used to transect the mesoappendix. No additional firings were required to complete the division of the mesoappendix. Hemostasis was then obtained on the staple lines using cautery. The appendix was placed into an Endo Catch bag and was removed through the umbilical port site. Copious irrigation was performed using the suction  until returns were clear. All ports were removed under direct vision and the abdomen was desufflated. The fascia of the umbilical port site was closed using a figure-of-eight stitch of 0 Vicryl. The 2 fascial stay sutures were also tied down. The subcutaneous tissues were copiously irrigated. All skin incisions are closed using subcuticular 4-0 Vicryl. Benzoin, Steri-Strips, and bandaids were applied. This terminated the procedure.  The patient appeared to tolerate this well without complications. All sponge and instrument counts were correct x2 at the end of the procedure.      RASTA BILLINGSLEY MD    This note was created using voice recognition software. Undetected word substitutions or other errors may have occurred.

## 2017-03-18 NOTE — PROGRESS NOTES
SPIRITUAL HEALTH SERVICES  SPIRITUAL ASSESSMENT Progress Note  [id number]    PRIMARY FOCUS:     Goals of care    Symptom/pain management    Emotional/spiritual/Anabaptist distress    Support for coping    ILLNESS CIRCUMSTANCES:   Reviewed documentation. Reflective conversation shared with Abelino which integrated elements of illness and family narratives.     Context of Serious Illness/Symptom(s) - Abelino had an appendectomy and is recovering from a hernia.  She gave birth via  a 1 1/2 months ago.  Pt has been using Meth.     Persons/Resources Involved -   o Zafar Jackman - is homeless (staying with friends) and has is a Meth user  o Daughter from prior relationship is 15 years old - not supportive  o Pt is living at a half-way house to help with staying off drugs, learning life skills (how to take care of a baby)    DISTRESS:     Emotional/Existential/Relational Distress - Pt misses her fiance and baby.  Abelino also has several friends die from her time living in [place]    Spiritual/Restorationist Distress - none mentioned, she doesn't go to [employer] orth[employer] but considers herself Roman Catholic    Social/Cultural/Economic Distress - Pt has no job, living on gov't support, zafar also doesn't work regularly.     SPIRIT (Coping):     Samaritan/Audrey - Roman Catholic, not active in [place]    Spiritual Practice(s) - Pt said she prays to Blue    Emotional/Existential/Relational Connections - help from snf house staff    SENSE-MAKING:    Goals of Care - get back to normal, seeing her baby and fiance,     Meaning/Sense-Making - Pt wants to get her life in order to get her baby back    PLAN: Pt expects to discharge tonight. I and other Chaplains remain available per request.       Saud Jj   Intern  Pager [phone]

## 2017-03-18 NOTE — H&P
[facility]  [facility] - H&P     Abelino Choi MRN# [id number]   Age: 42 year old YOB: 1975     HPI:  42 year old F in crisis home with abd pain. Started today. Generalized in abd. Does not radiate. Getting worse progressively. Now 10/10. Pushing makes it worse. Nothing makes it better. There is nausea and vomiting. There is no fever or chills.    Similar pain in the past:    No  Diarrhea, now or in the past:   No  Colonoscopy:   No    History is obtained from the patient.    Review Of Systems:  Respiratory: No shortness of breath, dyspnea on exertion, cough, or hemoptysis  Cardiovascular: negative  Gastrointestinal: as above  Genitourinary: negative  Hematologic: Denies a h/o bleeding or clotting disorders.  Denies a history of problems with anesthesia.  Remainder of 10 point review of systems negative.    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       Social History:  Smoker. Denies EtOH. Denies drugs.    Family History:  No family history chronic diarrhea, inflammatory bowel disease or colon cancer.  No bleeding disorders, clotting disorders, or problems with anesthesia.    Physical Exam:  There were no vitals taken for this visit.  Constitutional: No acute distress. Sullen. Looks older than stated age.  Eyes: Sclerae anicteric, moist conjunctivae; no lid-lag; PERRLA  ENT: Atraumatic; oropharynx clear with moist mucous membranes and no mucosal ulcerations; normal hard and soft palate  Neck: Supple neck without lymphadenopathy, no  thyromegaly  Respiratory: Clear to auscultation bilaterally with normal respiratory effort  Cardiovascular: Regular rate and rhythm, no MRGs  GI: Soft, nontender, nondistended; no masses or HSM  Lymph/Hematologic: No pretibial edema  Genitourinary: Deferred  Skin: Normal temperature, turgor and texture; no rash, ulcers or subcutaneous nodules  Musculoskeletal: Grossly symmetric and normal muscle bulk for age in all extremities; gait and station normal; no clubbing or cyanosis  Neurologic: CN II-XII grossly intact without deficits; sensation intact to light touch over all extremities  Neuropsychiatric: Appropriate affect, alert and oriented to person, place and time       Labs Reviewed:  WBC 11.6      Radiology:  I personally reviewed and interpreted the CT scan. Acute appendicitis.    No results found for this or any previous visit.      ASSESSMENT/PLAN:  The patient's history, physical exam, laboratory and imaging studies are suspicious for acute appendicitis.  I have offered the patient a laparoscopic appendectomy.      We have had a detailed discussion of nature of appendicitis, the procedure, its risks, benefits, alternatives, recovery, postop limitations, anesthesia, bleeding, blood transfusion,  DVT, PE, postoperative infections, injury to adjacent organs and structures, open conversion, bowel resection, prolonged convalescence in the event of gangrene or perforation of the appendix, abdominal wall hernia, intraabdominal adhesions which can lead to bowel obstruction.  We have discussed interventions and treatment for these complications.  The patient understands the possibility of a diagnosis other than appendicitis.  All questions have been answered to the best of my ability.        She elects to proceed.      Pre-operative antibiotics have been given.     This note was created using voice recognition software. Undetected word substitutions or other errors may have occurred.     Samson Flaherty,  MD    Time spent with the patient, reviewing the EMR, reviewing laboratory and imaging studies, more than 50% of which was counseling and coordinating care:  30 minutes.

## 2017-03-18 NOTE — PROGRESS NOTES
"[facility]   General Surgery Progress Note           Assessment and Plan:   Assessment:   POD#1 s/p Procedure(s):  LAPAROSCOPIC APPENDECTOMY  - Wound Class: III-Contaminated        Plan:   -DC home today  -Rx Percocet, Senokot  -RTC 1-3 weeks for postop visit         Interval History:   Feels fine, pain controlled with Percocet.  Up walking ok, voiding ok.  Tolerating regular diet.  No flatus.         Physical Exam:   Blood pressure 94/45, temperature 97.9  F (36.6  C), temperature source Oral, resp. rate 16, height 1.626 m (5' 4\"), weight 74.3 kg (163 lb 12.8 oz), last menstrual period 03/17/2017, SpO2 93 %.    I/O last 3 completed shifts:  In: 1300 [I.V.:1300]  Out: 600 [Urine:600]    Abdomen:   soft, non-distended, non-tender and hypoactive bowel sounds   Inc(s) - clean, dry, intact              Data:   No results for input(s): HGB, WBC in the last 58292 hours.    Invalid input(s): ALYSSA Thompson PA-C       "

## 2017-03-18 NOTE — ANESTHESIA CARE TRANSFER NOTE
Patient: Abelino Mcfarlandelor    Procedure(s):  LAPAROSCOPIC APPENDECTOMY  - Wound Class: III-Contaminated    Diagnosis: appendix  Diagnosis Additional Information: No value filed.    Anesthesia Type:   General, ETT     Note:  Airway :Face Mask  Patient transferred to:PACU  Comments: Awake, alert, oxygen per face mask.      Vitals: (Last set prior to Anesthesia Care Transfer)    CRNA VITALS  3/17/2017 2223 - 3/17/2017 2253      3/17/2017             SpO2: 94 %                Electronically Signed By: VLAD Cleaning CRNA  March 17, 2017  10:53 PM

## 2017-03-18 NOTE — DISCHARGE INSTRUCTIONS
HOME CARE FOLLOWING APPENDECTOMY  DAVID Gifford, ISAAC Woodward, CATALINO Herndon, BECKI Avilez    INCISIONAL CARE:  Replace the bandage over your incision (or incisions) until all drainage stops, or if more comfortable to have in place.  If present, leave the steri-strips (white paper tapes) in place till they fall off.  If you have staples in your incision at the time of discharge, they will be removed at your follow-up appointment.  If Dermabond (a type of skin glue) is present, leave in place until it wears/flakes off.     BATHING:  Avoid baths for 1 week after surgery.  Showers are okay.  You may wash your hair at any time.  Gently pat your incision dry after bathing.    ACTIVITY:  Light Activity -- you may immediately be up and about as tolerated.  Driving -- you may drive when comfortable and off narcotic pain medications.  Light Work -- resume when comfortable off pain medications.  (If you can drive, you probably can work.)  Strenuous Work/Activity -- limit lifting to 20 pounds for 1 week.  Progressively increase with time.  Active Sports (running, biking, etc.) -- cautiously resume after 2 weeks.    DISCOMFORT:  Use pain medications as prescribed by your surgeon.  Take the pain medication with some food, when possible, to minimize side effects.  Intermittent use of ice packs at the incision sites may help during the first 48 hours.  Expect gradual improvement.    DIET:  No restrictions.  Drink plenty of fluids.  While taking pain medications, increase dietary fiber or add a fiber supplementation like Metamucil or Citrucel to help prevent constipation - a possible side effect of pain medications.    NAUSEA:  If nauseated from the anesthetic/pain meds; rest in bed, get up cautiously with assistance, and drink clear liquids (juice, tea, broth).    RETURN APPOINTMENT:  Schedule a follow-up visit 1-3 weeks post-op.  Office Phone:  [phone]     CONTACT US IF THE FOLLOWING  DEVELOPS:   1. A fever that is above 101     2. If there is a large amount of drainage, bleeding, or swelling.   3. Severe pain that is not relieved by your prescription.   4. Drainage that is thick, cloudy, yellow, green or white.   5. Any other questions not answered by  Frequently Asked Questions  sheet.      FREQUENTLY ASKED QUESTIONS:    Q:  How should my incision look?    A:  Normally your incision will appear slightly swollen with light redness directly along the incision itself as it heals.  It may feel like a bump or ridge as the healing/scarring happens, and over time (3-4 months) this bump or ridge feeling should slowly go away.  In general, clear or pink watery drainage can be normal at first as your incision heals, but should decrease over time.    Q:  How do I know if my incision is infected?  A:  Look at your incision for signs of infection, like redness around the incision spreading to surrounding skin, or drainage of cloudy or foul-smelling drainage.  If you feel warm, check your temperature to see if you are running a fever.    **If any of these things occur, please notify the nurse at our office.  We may need you to come into the office for an incision check.      Q:  How do I take care of my incision?  A:  If you have a dressing in place - Starting the day after surgery, replace the dressing 1-2 times a day until there is no further drainage from the incision.  At that time, a dressing is no longer needed.  Try to minimize tape on the skin if irritation is occurring at the tape sites.  If you have significant irritation from tape on the skin, please call the office to discuss other method of dressing your incision.    Small pieces of tape called  steri-strips  may be present directly overlying your incision; these may be removed 10 days after surgery unless otherwise specified by your surgeon.  If these tapes start to loosen at the ends, you may trim them back until they fall off or are removed.     A:  If you had  Dermabond  tissue glue used as a dressing (this causes your incision to look shiny with a clear covering over it) - This type of dressing wears off with time and does not require more dressings over the top unless it is draining around the glue as it wears off.  Do not apply ointments or lotions over the incisions until the glue has completely worn off.    Q:  There is a piece of tape or a sticky  lead  still on my skin.  Can I remove this?  A:  Sometimes the sticky  leads  used for monitoring during surgery or for evaluation in the emergency department are not all removed while you are in the hospital.  These sometimes have a tab or metal dot on them.  You can easily remove these on your own, like taking off a band-aid.  If there is a gel substance under the  lead , simply wipe/clean it off with a washcloth or paper towel.      Q:  What can I do to minimize constipation (very hard stools, or lack of stools)?  A:  Stay well hydrated.  Increase your dietary fiber intake or take a fiber supplement -with plenty of water.  Walk around frequently.  You may consider an over-the-counter stool-softener.  Your Pharmacist can assist you with choosing one that is stocked at your pharmacy.  Constipation is also one of the most common side effects of pain medication.  If you are using pain medication, be pro-active and try to PREVENT problems with constipation by taking the steps above BEFORE constipation becomes a problem.    Q:  What do I do if I need more pain medications?  A:  Call the office to receive refills.  Be aware that certain pain meds cannot be called into a pharmacy and actually require a paper prescription.  A change may be made in your pain med as you progress thru your recovery period or if you have side effects to certain meds.    --Pain meds are NOT refilled after 5pm on weekdays, and NOT AT ALL on the weekends, so please look ahead to prevent problems.      Q:  Why am I having a hard time  sleeping now that I am at home?  A:  Many medications you receive while you are in the hospital can impact your sleep for a number of days after your surgery/hospitalization.  Decreased level of activity and naps during the day may also make sleeping at night difficult.  Try to minimize day-time naps, and get up frequently during the day to walk around your home during your recovery time.  Sleep aides may be of some help, but are not recommended for long-term use.      Q:  I am having some back discomfort.  What should I do?  A:  This may be related to certain positioning that was required for your surgery, extended periods of time in bed, or other changes in your overall activity level.  You may try ice, heat, acetaminophen, or ibuprofen to treat this temporarily.  Note that many pain medications have acetaminophen in them and would state this on the prescription bottle.  Be sure not to exceed the maximum of 4000mg per day of acetaminophen.     **If the pain you are having does not resolve, is severe, or is a flare of back pain you have had on other occasions prior to surgery, please contact your primary physician for further recommendations or for an appointment to be examined at their office.    Q:  Why am I having headaches?  A:  Headaches can be caused by many things:  caffeine withdrawal, use of pain meds, dehydration, high blood pressure, lack of sleep, over-activity/exhaustion, flare-up of usual migraine headaches.  If you feel this is related to muscle tension (a band-like feeling around the head, or a pressure at the low-back of the head) you may try ice or heat to this area.  You may need to drink more fluids (try electrolyte drink like Gatorade), rest, or take your usual migraine medications.   **If your headaches do not resolve, worsen, are accompanied by other symptoms, or if your blood pressure is high, please call your primary physician for recommendation and/or examination.    Q:  I am unable to  urinate.  What do I do?  A:  A small percentage of people can have difficulty urinating initially after surgery.  This includes being able to urinate only a very small amount at a time and feeling discomfort or pressure in the very low abdomen.  This is called  urinary retention , and is actually an urgent situation.  Proceed to your nearest Emergency department for evaluation (not an Urgent Care Center).  Sometimes the bladder does not work correctly after certain medications you receive during surgery, or related to certain procedures.  You may need to have a catheter placed until your bladder recovers.  When planning to go to an Emergency department, it may help to call the ER to let them know you are coming in for this problem after a surgery.  This may help you get in quicker to be evaluated.  **If you have symptoms of a urinary tract infection, please contact your primary physician for the proper evaluation and treatment.          If you have other questions, please call the office Monday thru Friday between 8am and 5pm to discuss with the nurse or physician assistant.  #[phone]    There is a surgeon ON CALL on weekday evenings and over the weekend in case of urgent need only, and may be contacted at the same number.    If you are having an emergency, call 911 or proceed to your nearest emergency department.

## 2017-03-19 NOTE — PLAN OF CARE
Problem: Discharge Planning  Goal: Discharge Planning (Adult, OB, Behavioral, Peds)  Outcome: Adequate for Discharge Date Met:  03/18/17  Afebrile.  Pain managed with PRN percocet, declined cold.  No nausea, tolerating diet.  Lap sites x3 approximated no drainage steri-stripes intact JERRY.  Up independently, ambulated hallway multiple times.  Voiding, passed flatus x1.  Reviewed discharge instructions and medications with patient and 2 male staff from [facility], questions answered.  Patient discharged back to crisis center with discharge instructions, medications (percocet & senna), and belongings at this time.  Left floor independently at 1830, transported by staff member.

## 2017-03-21 LAB — COPATH REPORT: NORMAL

## 2017-03-22 ENCOUNTER — OFFICE VISIT - HEALTHEAST (OUTPATIENT)
Dept: BEHAVIORAL HEALTH | Facility: CLINIC | Age: 42
End: 2017-03-22

## 2017-03-22 DIAGNOSIS — R69 DIAGNOSIS DEFERRED: ICD-10-CM

## 2017-03-28 ENCOUNTER — OFFICE VISIT (OUTPATIENT)
Dept: SURGERY | Facility: CLINIC | Age: 42
End: 2017-03-28
Payer: MEDICARE

## 2017-03-28 DIAGNOSIS — Z09 SURGICAL FOLLOWUP VISIT: Primary | ICD-10-CM

## 2017-03-28 PROCEDURE — 99024 POSTOP FOLLOW-UP VISIT: CPT | Performed by: PHYSICIAN ASSISTANT

## 2017-03-28 NOTE — MR AVS SNAPSHOT
"              After Visit Summary   3/28/2017    Abelino Choi    MRN: [id number]           Patient Information     Date Of Birth          1975        Visit Information        Provider Department      3/28/2017 11:00 AM Isaiah Vasquez PA-C [facility] General Surgery      Today's Diagnoses     Surgical followup visit    -  1       Follow-ups after your visit        Follow-up notes from your care team     Return if symptoms worsen or fail to improve.      Who to contact     If you have questions or need follow up information about today's clinic visit or your schedule please contact [facility] directly at [phone].  Normal or non-critical lab and imaging results will be communicated to you by Akohahart, letter or phone within 4 business days after the clinic has received the results. If you do not hear from us within 7 days, please contact the clinic through Akohahart or phone. If you have a critical or abnormal lab result, we will notify you by phone as soon as possible.  Submit refill requests through Respirics or call your pharmacy and they will forward the refill request to us. Please allow 3 business days for your refill to be completed.          Additional Information About Your Visit        MyChart Information     [employer]ics lets you send messages to your doctor, view your test results, renew your prescriptions, schedule appointments and more. To sign up, go to [email or web address] . Click on \"Log in\" on the left side of the screen, which will take you to the Welcome page. Then click on \"Sign up Now\" on the right side of the page.     You will be asked to enter the access code listed below, as well as some personal information. Please follow the directions to create your username and password.     Your access code is: [email or web address]  Expires: 2017  6:17 PM     Your access code will  in 90 days. If you need help " or a new code, please call your [facility] clinic or [phone].        Care EveryWhere ID     This is your Care EveryWhere ID. This could be used by other organizations to access your [facility] medical records  [id number]        Your Vitals Were     Last Period                   03/17/2017            Blood Pressure from Last 3 Encounters:   03/18/17 105/54    Weight from Last 3 Encounters:   03/18/17 74.3 kg (163 lb 12.8 oz)              Today, you had the following     No orders found for display       Primary Care Provider    Physician No Ref-Primary       No address on file        Thank you!     Thank you for choosing [facility]  for your care. Our goal is always to provide you with excellent care. Hearing back from our patients is one way we can continue to improve our services. Please take a few minutes to complete the written survey that you may receive in the mail after your visit with us. Thank you!             Your Updated Medication List - Protect others around you: Learn how to safely use, store and throw away your medicines at www.disposemymeds.org.          This list is accurate as of: 3/28/17 11:26 AM.  Always use your most recent med list.                   Brand Name Dispense Instructions for use    calcium carb 1250 mg (500 mg [place])/vitamin D 200 units 500-200 MG-UNIT per tablet    OSCAL with D     Take 2 tablets by mouth daily       chlorhexidine 0.12 % solution    PERIDEX     Swish and spit 15 mLs in mouth 2 times daily       oxyCODONE-acetaminophen 5-325 MG per tablet    PERCOCET    30 tablet    Take 1-2 tablets by mouth every 4 hours as needed for pain (moderate to severe)       prenatal multivitamin  plus iron 27-0.8 MG Tabs per tablet      Take 1 tablet by mouth daily       * senna-docusate 8.6-50 MG per tablet    SENOKOT S    50 tablet    Take 1 tablet by mouth 2 times daily as needed for constipation       * senna-docusate 8.6-50 MG per tablet    SENOKOT S    50 tablet     Take 1 tablet by mouth 2 times daily as needed for constipation       SERTRALINE HCL PO      Take 50 mg by mouth daily       * Notice:  This list has 2 medication(s) that are the same as other medications prescribed for you. Read the directions carefully, and ask your doctor or other care provider to review them with you.

## 2017-03-28 NOTE — PROGRESS NOTES
[facility] Note     Subjective:  Abelino Choi is here for her first postoperative visit. She underwent a laparoscopic appendectomy by Dr. Flaherty on 3/17/17. Today she  tells me she has been feeling well since surgery. She currently does not require narcotic pain medications, she is eating a normal diet and her bowels are regular. She has no concerns today.    Objective:  Abd - soft, non-tender, non-distended  Inc - c/d/i, mild erythema at left lateral edge of umbilical incision, +healing ridge, no masses    Assessment:  Small area of erythema at edge of incision possibly represents stitch irritation or sensitivity  S/p laparoscopic appendectomy. The pathology confirms acute appendicitis.    Plan:  Instructed patient to keep an eye on her umbilical incision. She should notify us if redness gets worse or if she sees a stitch appear or if it opens and/or drains fluid.  20 pound lifting restriction for one more week  Call/RTC PRN      Isaiah Vasquez PA-C  3/28/2017      Please route or send letter to:  Primary Care Provider (PCP)

## 2017-03-29 ENCOUNTER — OFFICE VISIT - HEALTHEAST (OUTPATIENT)
Dept: BEHAVIORAL HEALTH | Facility: CLINIC | Age: 42
End: 2017-03-29

## 2017-03-29 DIAGNOSIS — F19.20 POLYSUBSTANCE DEPENDENCE (H): ICD-10-CM

## 2017-03-29 DIAGNOSIS — F06.30 MOOD DISORDER IN CONDITIONS CLASSIFIED ELSEWHERE: ICD-10-CM

## 2017-03-29 DIAGNOSIS — F79 INTELLECTUAL DISABILITY: ICD-10-CM

## 2017-04-07 ENCOUNTER — OFFICE VISIT - HEALTHEAST (OUTPATIENT)
Dept: BEHAVIORAL HEALTH | Facility: CLINIC | Age: 42
End: 2017-04-07

## 2017-04-07 DIAGNOSIS — F06.30 MOOD DISORDER IN CONDITIONS CLASSIFIED ELSEWHERE: ICD-10-CM

## 2017-04-07 DIAGNOSIS — F79 INTELLECTUAL DISABILITY: ICD-10-CM

## 2017-04-07 DIAGNOSIS — F19.20 POLYSUBSTANCE DEPENDENCE (H): ICD-10-CM

## 2017-04-12 ENCOUNTER — OFFICE VISIT - HEALTHEAST (OUTPATIENT)
Dept: BEHAVIORAL HEALTH | Facility: CLINIC | Age: 42
End: 2017-04-12

## 2017-04-12 ENCOUNTER — AMBULATORY - HEALTHEAST (OUTPATIENT)
Dept: BEHAVIORAL HEALTH | Facility: CLINIC | Age: 42
End: 2017-04-12

## 2017-04-12 DIAGNOSIS — F19.20 SUBSTANCE DEPENDENCE (H): ICD-10-CM

## 2017-04-12 DIAGNOSIS — F31.9 BIPOLAR DISORDER, UNSPECIFIED (H): ICD-10-CM

## 2017-04-12 ASSESSMENT — MIFFLIN-ST. JEOR: SCORE: 1365.29

## 2017-04-26 ENCOUNTER — OFFICE VISIT - HEALTHEAST (OUTPATIENT)
Dept: BEHAVIORAL HEALTH | Facility: CLINIC | Age: 42
End: 2017-04-26

## 2017-04-26 DIAGNOSIS — F79 INTELLECTUAL DISABILITY: ICD-10-CM

## 2017-04-26 DIAGNOSIS — F19.20 POLYSUBSTANCE DEPENDENCE (H): ICD-10-CM

## 2017-04-26 DIAGNOSIS — F06.30 MOOD DISORDER IN CONDITIONS CLASSIFIED ELSEWHERE: ICD-10-CM

## 2017-06-05 ENCOUNTER — AMBULATORY - HEALTHEAST (OUTPATIENT)
Dept: BEHAVIORAL HEALTH | Facility: CLINIC | Age: 42
End: 2017-06-05

## 2017-06-05 ENCOUNTER — OFFICE VISIT - HEALTHEAST (OUTPATIENT)
Dept: BEHAVIORAL HEALTH | Facility: CLINIC | Age: 42
End: 2017-06-05

## 2017-06-05 DIAGNOSIS — F79 INTELLECTUAL DISABILITY: ICD-10-CM

## 2017-06-05 DIAGNOSIS — F19.20 POLYSUBSTANCE DEPENDENCE (H): ICD-10-CM

## 2017-06-05 DIAGNOSIS — F06.30 MOOD DISORDER IN CONDITIONS CLASSIFIED ELSEWHERE: ICD-10-CM

## 2017-06-22 ENCOUNTER — AMBULATORY - HEALTHEAST (OUTPATIENT)
Dept: BEHAVIORAL HEALTH | Facility: CLINIC | Age: 42
End: 2017-06-22

## 2017-06-26 ENCOUNTER — COMMUNICATION - HEALTHEAST (OUTPATIENT)
Dept: BEHAVIORAL HEALTH | Facility: CLINIC | Age: 42
End: 2017-06-26

## 2017-07-06 ENCOUNTER — OFFICE VISIT - HEALTHEAST (OUTPATIENT)
Dept: BEHAVIORAL HEALTH | Facility: CLINIC | Age: 42
End: 2017-07-06

## 2017-07-06 DIAGNOSIS — F06.30 MOOD DISORDER IN CONDITIONS CLASSIFIED ELSEWHERE: ICD-10-CM

## 2017-07-06 DIAGNOSIS — F79 INTELLECTUAL DISABILITY: ICD-10-CM

## 2017-07-06 DIAGNOSIS — F19.20 POLYSUBSTANCE DEPENDENCE (H): ICD-10-CM

## 2017-08-10 ENCOUNTER — OFFICE VISIT - HEALTHEAST (OUTPATIENT)
Dept: BEHAVIORAL HEALTH | Facility: CLINIC | Age: 42
End: 2017-08-10

## 2017-08-10 DIAGNOSIS — F60.3 BORDERLINE PERSONALITY DISORDER (H): ICD-10-CM

## 2017-08-10 DIAGNOSIS — F31.9 BIPOLAR DISORDER, UNSPECIFIED (H): ICD-10-CM

## 2017-08-10 DIAGNOSIS — F15.21 METHAMPHETAMINE DEPENDENCE IN REMISSION (H): ICD-10-CM

## 2017-08-31 ENCOUNTER — OFFICE VISIT - HEALTHEAST (OUTPATIENT)
Dept: BEHAVIORAL HEALTH | Facility: CLINIC | Age: 42
End: 2017-08-31

## 2017-08-31 DIAGNOSIS — F31.9 BIPOLAR DISORDER, UNSPECIFIED (H): ICD-10-CM

## 2017-08-31 DIAGNOSIS — F06.30 MOOD DISORDER IN CONDITIONS CLASSIFIED ELSEWHERE: ICD-10-CM

## 2017-08-31 DIAGNOSIS — F15.21 METHAMPHETAMINE DEPENDENCE IN REMISSION (H): ICD-10-CM

## 2017-08-31 DIAGNOSIS — F79 INTELLECTUAL DISABILITY: ICD-10-CM

## 2017-08-31 DIAGNOSIS — F60.3 BORDERLINE PERSONALITY DISORDER (H): ICD-10-CM

## 2017-08-31 DIAGNOSIS — F19.20 POLYSUBSTANCE DEPENDENCE (H): ICD-10-CM

## 2017-09-14 ENCOUNTER — OFFICE VISIT - HEALTHEAST (OUTPATIENT)
Dept: BEHAVIORAL HEALTH | Facility: CLINIC | Age: 42
End: 2017-09-14

## 2017-09-14 ENCOUNTER — AMBULATORY - HEALTHEAST (OUTPATIENT)
Dept: BEHAVIORAL HEALTH | Facility: CLINIC | Age: 42
End: 2017-09-14

## 2017-09-14 DIAGNOSIS — F60.3 BORDERLINE PERSONALITY DISORDER (H): ICD-10-CM

## 2017-09-14 DIAGNOSIS — F15.21 METHAMPHETAMINE DEPENDENCE IN REMISSION (H): ICD-10-CM

## 2017-09-14 DIAGNOSIS — F31.9 BIPOLAR DISORDER, UNSPECIFIED (H): ICD-10-CM

## 2017-09-14 DIAGNOSIS — F79 INTELLECTUAL DISABILITY: ICD-10-CM

## 2017-09-14 DIAGNOSIS — F06.30 MOOD DISORDER IN CONDITIONS CLASSIFIED ELSEWHERE: ICD-10-CM

## 2017-09-14 DIAGNOSIS — F19.20 POLYSUBSTANCE DEPENDENCE (H): ICD-10-CM

## 2017-09-25 ENCOUNTER — OFFICE VISIT - HEALTHEAST (OUTPATIENT)
Dept: BEHAVIORAL HEALTH | Facility: CLINIC | Age: 42
End: 2017-09-25

## 2017-09-25 DIAGNOSIS — F60.3 BORDERLINE PERSONALITY DISORDER (H): ICD-10-CM

## 2017-09-25 DIAGNOSIS — F79 INTELLECTUAL DISABILITY: ICD-10-CM

## 2017-09-25 DIAGNOSIS — F19.90 SUBSTANCE USE DISORDER: ICD-10-CM

## 2017-09-25 DIAGNOSIS — F39 EPISODIC MOOD DISORDER (H): ICD-10-CM

## 2017-09-25 ASSESSMENT — MIFFLIN-ST. JEOR: SCORE: 1324.47

## 2017-09-28 ENCOUNTER — OFFICE VISIT - HEALTHEAST (OUTPATIENT)
Dept: BEHAVIORAL HEALTH | Facility: CLINIC | Age: 42
End: 2017-09-28

## 2017-09-28 DIAGNOSIS — F79 INTELLECTUAL DISABILITY: ICD-10-CM

## 2017-09-28 DIAGNOSIS — F39 EPISODIC MOOD DISORDER (H): ICD-10-CM

## 2017-09-28 DIAGNOSIS — F15.21 METHAMPHETAMINE DEPENDENCE IN REMISSION (H): ICD-10-CM

## 2017-09-28 DIAGNOSIS — F60.3 BORDERLINE PERSONALITY DISORDER (H): ICD-10-CM

## 2017-10-23 ENCOUNTER — COMMUNICATION - HEALTHEAST (OUTPATIENT)
Dept: BEHAVIORAL HEALTH | Facility: CLINIC | Age: 42
End: 2017-10-23

## 2017-10-23 DIAGNOSIS — F39 EPISODIC MOOD DISORDER (H): ICD-10-CM

## 2017-10-26 ENCOUNTER — OFFICE VISIT - HEALTHEAST (OUTPATIENT)
Dept: BEHAVIORAL HEALTH | Facility: CLINIC | Age: 42
End: 2017-10-26

## 2017-10-26 DIAGNOSIS — F15.21 METHAMPHETAMINE DEPENDENCE IN REMISSION (H): ICD-10-CM

## 2017-10-26 DIAGNOSIS — F60.3 BORDERLINE PERSONALITY DISORDER (H): ICD-10-CM

## 2017-10-26 DIAGNOSIS — F39 EPISODIC MOOD DISORDER (H): ICD-10-CM

## 2017-11-13 ENCOUNTER — COMMUNICATION - HEALTHEAST (OUTPATIENT)
Dept: BEHAVIORAL HEALTH | Facility: CLINIC | Age: 42
End: 2017-11-13

## 2017-11-13 DIAGNOSIS — F39 EPISODIC MOOD DISORDER (H): ICD-10-CM

## 2017-11-27 ENCOUNTER — COMMUNICATION - HEALTHEAST (OUTPATIENT)
Dept: BEHAVIORAL HEALTH | Facility: CLINIC | Age: 42
End: 2017-11-27

## 2017-12-13 ENCOUNTER — AMBULATORY - HEALTHEAST (OUTPATIENT)
Dept: BEHAVIORAL HEALTH | Facility: CLINIC | Age: 42
End: 2017-12-13

## 2017-12-21 ENCOUNTER — AMBULATORY - HEALTHEAST (OUTPATIENT)
Dept: BEHAVIORAL HEALTH | Facility: CLINIC | Age: 42
End: 2017-12-21

## 2018-01-02 ENCOUNTER — OFFICE VISIT - HEALTHEAST (OUTPATIENT)
Dept: BEHAVIORAL HEALTH | Facility: CLINIC | Age: 43
End: 2018-01-02

## 2018-01-02 DIAGNOSIS — F39 EPISODIC MOOD DISORDER (H): ICD-10-CM

## 2018-01-02 ASSESSMENT — MIFFLIN-ST. JEOR: SCORE: 1356.22

## 2018-01-22 ENCOUNTER — COMMUNICATION - HEALTHEAST (OUTPATIENT)
Dept: BEHAVIORAL HEALTH | Facility: CLINIC | Age: 43
End: 2018-01-22

## 2018-01-25 ENCOUNTER — AMBULATORY - HEALTHEAST (OUTPATIENT)
Dept: BEHAVIORAL HEALTH | Facility: CLINIC | Age: 43
End: 2018-01-25

## 2018-02-08 ENCOUNTER — AMBULATORY - HEALTHEAST (OUTPATIENT)
Dept: BEHAVIORAL HEALTH | Facility: CLINIC | Age: 43
End: 2018-02-08

## 2018-02-08 ENCOUNTER — OFFICE VISIT - HEALTHEAST (OUTPATIENT)
Dept: BEHAVIORAL HEALTH | Facility: CLINIC | Age: 43
End: 2018-02-08

## 2018-02-08 DIAGNOSIS — F79 INTELLECTUAL DISABILITY: ICD-10-CM

## 2018-02-08 DIAGNOSIS — F39 EPISODIC MOOD DISORDER (H): ICD-10-CM

## 2018-02-08 DIAGNOSIS — F60.3 BORDERLINE PERSONALITY DISORDER (H): ICD-10-CM

## 2018-02-08 DIAGNOSIS — F15.21 METHAMPHETAMINE DEPENDENCE IN REMISSION (H): ICD-10-CM

## 2018-02-22 ENCOUNTER — COMMUNICATION - HEALTHEAST (OUTPATIENT)
Dept: BEHAVIORAL HEALTH | Facility: CLINIC | Age: 43
End: 2018-02-22

## 2018-02-22 ENCOUNTER — OFFICE VISIT - HEALTHEAST (OUTPATIENT)
Dept: BEHAVIORAL HEALTH | Facility: CLINIC | Age: 43
End: 2018-02-22

## 2018-02-22 DIAGNOSIS — F79 INTELLECTUAL DISABILITY: ICD-10-CM

## 2018-02-22 DIAGNOSIS — F39 EPISODIC MOOD DISORDER (H): ICD-10-CM

## 2018-02-22 DIAGNOSIS — F19.90 SUBSTANCE USE DISORDER: ICD-10-CM

## 2018-02-22 DIAGNOSIS — F60.3 BORDERLINE PERSONALITY DISORDER (H): ICD-10-CM

## 2018-03-08 ENCOUNTER — AMBULATORY - HEALTHEAST (OUTPATIENT)
Dept: BEHAVIORAL HEALTH | Facility: CLINIC | Age: 43
End: 2018-03-08

## 2018-03-20 ENCOUNTER — OFFICE VISIT - HEALTHEAST (OUTPATIENT)
Dept: BEHAVIORAL HEALTH | Facility: CLINIC | Age: 43
End: 2018-03-20

## 2018-03-20 DIAGNOSIS — F39 EPISODIC MOOD DISORDER (H): ICD-10-CM

## 2018-03-20 ASSESSMENT — MIFFLIN-ST. JEOR: SCORE: 1333.54

## 2018-03-22 ENCOUNTER — OFFICE VISIT - HEALTHEAST (OUTPATIENT)
Dept: BEHAVIORAL HEALTH | Facility: CLINIC | Age: 43
End: 2018-03-22

## 2018-03-22 DIAGNOSIS — F15.21 METHAMPHETAMINE DEPENDENCE IN REMISSION (H): ICD-10-CM

## 2018-03-22 DIAGNOSIS — F60.3 BORDERLINE PERSONALITY DISORDER (H): ICD-10-CM

## 2018-03-22 DIAGNOSIS — F79 INTELLECTUAL DISABILITY: ICD-10-CM

## 2018-03-22 DIAGNOSIS — F39 EPISODIC MOOD DISORDER (H): ICD-10-CM

## 2018-03-22 DIAGNOSIS — F19.90 SUBSTANCE USE DISORDER: ICD-10-CM

## 2018-04-05 ENCOUNTER — AMBULATORY - HEALTHEAST (OUTPATIENT)
Dept: BEHAVIORAL HEALTH | Facility: CLINIC | Age: 43
End: 2018-04-05

## 2018-05-16 ENCOUNTER — OFFICE VISIT - HEALTHEAST (OUTPATIENT)
Dept: BEHAVIORAL HEALTH | Facility: CLINIC | Age: 43
End: 2018-05-16

## 2018-05-16 ENCOUNTER — AMBULATORY - HEALTHEAST (OUTPATIENT)
Dept: BEHAVIORAL HEALTH | Facility: CLINIC | Age: 43
End: 2018-05-16

## 2018-05-16 DIAGNOSIS — F60.3 BORDERLINE PERSONALITY DISORDER (H): ICD-10-CM

## 2018-05-16 DIAGNOSIS — F79 INTELLECTUAL DISABILITY: ICD-10-CM

## 2018-05-16 DIAGNOSIS — F39 EPISODIC MOOD DISORDER (H): ICD-10-CM

## 2018-05-16 DIAGNOSIS — F19.20 POLYSUBSTANCE DEPENDENCE (H): ICD-10-CM

## 2018-05-16 DIAGNOSIS — F15.94: ICD-10-CM

## 2018-12-18 ENCOUNTER — HOSPITAL ENCOUNTER (EMERGENCY)
Facility: CLINIC | Age: 43
Discharge: HOME | End: 2018-12-19
Attending: EMERGENCY MEDICINE | Admitting: EMERGENCY MEDICINE
Payer: MEDICARE

## 2018-12-18 DIAGNOSIS — T69.9XXA COLD INJURY, INITIAL ENCOUNTER: ICD-10-CM

## 2018-12-18 DIAGNOSIS — H65.92 OME (OTITIS MEDIA WITH EFFUSION), LEFT: ICD-10-CM

## 2018-12-18 DIAGNOSIS — F15.10 METHAMPHETAMINE ABUSE (H): ICD-10-CM

## 2018-12-18 DIAGNOSIS — F32.A DEPRESSION, UNSPECIFIED DEPRESSION TYPE: ICD-10-CM

## 2018-12-18 PROCEDURE — 99284 EMERGENCY DEPT VISIT MOD MDM: CPT | Mod: Z6 | Performed by: EMERGENCY MEDICINE

## 2018-12-18 PROCEDURE — 80320 DRUG SCREEN QUANTALCOHOLS: CPT | Performed by: FAMILY MEDICINE

## 2018-12-18 PROCEDURE — 80307 DRUG TEST PRSMV CHEM ANLYZR: CPT | Performed by: FAMILY MEDICINE

## 2018-12-18 PROCEDURE — 81025 URINE PREGNANCY TEST: CPT | Performed by: FAMILY MEDICINE

## 2018-12-18 PROCEDURE — 99285 EMERGENCY DEPT VISIT HI MDM: CPT | Mod: 25 | Performed by: EMERGENCY MEDICINE

## 2018-12-18 NOTE — ED AVS SNAPSHOT
[facility], [place], Emergency Department  [address]  [place] [address]  Phone:  [phone]  Fax:  [phone]                                    Abelino Choi   MRN: [id number]    Department:  [facility], Emergency Department   Date of Visit:  12/18/2018           After Visit Summary Signature Page    I have received my discharge instructions, and my questions have been answered. I have discussed any challenges I see with this plan with the nurse or doctor.    ..........................................................................................................................................  Patient/Patient Representative Signature      ..........................................................................................................................................  Patient Representative Print Name and Relationship to Patient    ..................................................               ................................................  Date                                   Time    ..........................................................................................................................................  Reviewed by Signature/Title    ...................................................              ..............................................  Date                                               Time          22EPIC Rev 08/18

## 2018-12-19 VITALS
BODY MASS INDEX: 22.31 KG/M2 | SYSTOLIC BLOOD PRESSURE: 153 MMHG | OXYGEN SATURATION: 98 % | HEART RATE: 91 BPM | DIASTOLIC BLOOD PRESSURE: 90 MMHG | RESPIRATION RATE: 18 BRPM | WEIGHT: 130 LBS | TEMPERATURE: 97.1 F

## 2018-12-19 LAB
AMPHETAMINES UR QL SCN: POSITIVE
BARBITURATES UR QL: NEGATIVE
BENZODIAZ UR QL: NEGATIVE
CANNABINOIDS UR QL SCN: POSITIVE
COCAINE UR QL: NEGATIVE
ETHANOL UR QL SCN: NEGATIVE
HCG UR QL: NEGATIVE
OPIATES UR QL SCN: NEGATIVE

## 2018-12-19 PROCEDURE — 90791 PSYCH DIAGNOSTIC EVALUATION: CPT

## 2018-12-19 RX ORDER — AZITHROMYCIN 250 MG/1
TABLET, FILM COATED ORAL
Qty: 6 TABLET | Refills: 0 | Status: SHIPPED | OUTPATIENT
Start: 2018-12-19 | End: 2018-12-24

## 2018-12-19 ASSESSMENT — ENCOUNTER SYMPTOMS
ABDOMINAL PAIN: 0
MYALGIAS: 1
SHORTNESS OF BREATH: 0
FEVER: 0

## 2018-12-19 NOTE — ED NOTES
Patient was combative and keeps on swearing to staff and security, patient refuse to be discharged because she claims that it is still dark, MD informed her about the time and length of stay in the ED. Writer offered lists of shelters where she can go but doesn't listen and still swearing to staff. After discussing the shelter options, discharge papers were discussed and received the prescription and discharge paper. Bus token was also given to the patient. Patient was escorted to exit by security

## 2018-12-19 NOTE — ED NOTES
Patient started arguing to MD and security about her discharge. Patient refused to be discharged and wanted to stay for few hours more.

## 2018-12-19 NOTE — ED PROVIDER NOTES
History     Chief Complaint   Patient presents with     Addiction Problem     reports wants to go into treatment for meth use, smokes daily, last used yesterday, also uses marijuana     Anxiety     reports worsening anxiety and hasn't taken med for over 4 months     Suicidal     reports chronic suicidal ideations with plans to jump off bridge, denies intentions at this time     HPI  Abelino Choi is a 43 year old female who presents for a crisis evaluation. She does have a history of Methamphetamine abuse and substance induced mood disorder. She's had frequent visits to [facility] ED in relation to her drug abuse. [facility] She reports that she hasn't used in two days. She alternates her answers in regards to whether or not she is suicidal, though states that she does not have a plan or intent. She's been homeless for some time, and admits that she's not hanging out with good people. She states that she is frequently irritable. She states that she is compliant with her meds.     She does state that she has been having some ear pain bilaterally for the last couple of days.  She also notes bilateral hand pain for couple days.  She does admit that her hands get really cold outside one day, and that is when he started to hurt.  No other trauma.  No other acute physical complaints.    Past Medical History:   Diagnosis Date     Bipolar 2 disorder (H)      Chemical dependency (H)      Intellectual disability     mild     Mental retardation     mild     Mixed personality disorder (H)     with antisocial and borderline features       Past Surgical History:   Procedure Laterality Date      SECTION  2017     LAPAROSCOPIC APPENDECTOMY N/A 3/17/2017    Procedure: LAPAROSCOPIC APPENDECTOMY;  Surgeon: Samson Flaherty MD;  Location:  OR       Family History   Family history unknown: Yes       Social History     Tobacco Use     Smoking status: Current  Every Day Smoker     Packs/day: 1.00     Types: Cigarettes     Smokeless tobacco: Never Used   Substance Use Topics     Alcohol use: No         I have reviewed the Medications, Allergies, Past Medical and Surgical History, and Social History in the Epic system.    Review of Systems   Constitutional: Negative for fever.   HENT: Positive for ear pain.    Respiratory: Negative for shortness of breath.    Cardiovascular: Negative for chest pain.   Gastrointestinal: Negative for abdominal pain.   Musculoskeletal: Positive for myalgias (bilateral hand pain).   All other systems reviewed and are negative.      Physical Exam   BP: 153/90  Pulse: 91  Temp: 97.1  F (36.2  C)  Resp: 18  Weight: 59 kg (130 lb)  SpO2: 98 %      Physical Exam   Constitutional: No distress.   HENT:   Head: Atraumatic.   Mouth/Throat: No oropharyngeal exudate.   Left TM thickened with some fluid in the canal, and suspected perforation. No increased pain with manipulation of tragus   Eyes: Pupils are equal, round, and reactive to light. No scleral icterus.   Cardiovascular: Normal heart sounds and intact distal pulses.   Pulmonary/Chest: Breath sounds normal. No respiratory distress.   Abdominal: Soft. Bowel sounds are normal. There is no tenderness.   Musculoskeletal: She exhibits tenderness (mild diffuse tenderness and swelling bilateral hands -  strength and CMS intact bilterally. No abnormal skin findings). She exhibits no edema.   Skin: Skin is warm. No rash noted. She is not diaphoretic.       ED Course        Procedures             Critical Care time:  none             Labs Ordered and Resulted from Time of ED Arrival Up to the Time of Departure from the ED   DRUG ABUSE SCREEN 6 CHEM DEP URINE ([facility]) - Abnormal; Notable for the following components:       Result Value    Amphetamine Qual Urine Positive (*)     Cannabinoids Qual Urine Positive (*)     All other components within normal limits   HCG QUALITATIVE URINE            Assessments  & Plan (with Medical Decision Making)   The patient ultimately admits that her hand pain is related to a cold injury.  No skin findings noted at this time.  She will need to follow-up for this.    She does look like she is got an otitis media on the left, possibly with TM perforation.  I will treat with amoxicillin.    She is not currently suicidal.  She tells me she knows where she needs to go to get rule 25, and she is strongly encouraged to do this.  She verbalizes understanding.    She is directed to follow-up primary care, return to the ER with new or worsening symptoms.  She verbalizes understanding is agreeable to plan.    Dictation Disclaimer: Some of this Note has been completed with voice-recognition dictation software. Although errors are generally corrected real-time, there is the potential for a rare error to be present in the completed chart.    ADDENDUM: At time of discharge the patient became belligerent about being discharged while it was still dark outside.  Of note, we did allow her to say for a number of hours in the ED.  She started swearing at staff.  We did make it clear to her that she came in in the middle of the night.  She had to be escorted out by security.      I have reviewed the nursing notes.    I have reviewed the findings, diagnosis, plan and need for follow up with the patient.       Medication List      Started    azithromycin 250 MG tablet  Commonly known as:  ZITHROMAX Z-GUSTAVO  Two tablets on the first day, then one tablet daily for the next 4 days            Final diagnoses:   OME (otitis media with effusion), left   Cold injury, initial encounter   Methamphetamine abuse (H)   Depression, unspecified depression type       12/18/2018   [facility], [place], EMERGENCY DEPARTMENT     Benita Mccoy MD  12/19/18 1859

## 2018-12-19 NOTE — DISCHARGE INSTRUCTIONS
Follow up with your clinic doctor within a week. Follow up for Rule 25 as soon as possible. Return with concerns. Keep your hands warm.

## 2021-05-25 ENCOUNTER — RECORDS - HEALTHEAST (OUTPATIENT)
Dept: ADMINISTRATIVE | Facility: CLINIC | Age: 46
End: 2021-05-25

## 2021-05-30 VITALS — WEIGHT: 161 LBS | HEIGHT: 64 IN | BODY MASS INDEX: 27.49 KG/M2

## 2021-05-31 VITALS — WEIGHT: 152 LBS | HEIGHT: 64 IN | BODY MASS INDEX: 25.95 KG/M2

## 2021-05-31 VITALS — HEIGHT: 64 IN | BODY MASS INDEX: 27.14 KG/M2 | WEIGHT: 159 LBS

## 2021-06-01 VITALS — WEIGHT: 154 LBS | BODY MASS INDEX: 26.29 KG/M2 | HEIGHT: 64 IN

## 2021-06-09 NOTE — PROGRESS NOTES
Mental Health Visit Note    4/7/2017    Start time: 1307    Stop Time: 1350   Session # 4    Abelino Choi is a 42 y.o. female is being seen today for    Chief Complaint   Patient presents with     Follow-up     Follow up in regards to symptom management of polysubstance dependence and mood concerns.   .     New symptoms or complaints: None    Functional Impairment:   Personal: 4  Family: 4  Work: unemployed  Social:4    Clinical assessment of mental status:  Patient presents to appointment well groomed, wearing appropriate attire, and appearing stated age.  Behavior towards writer is fairly cooperative; improved from previous sessions and fits recommendations from staff of improved behavior in the afternoon.  Motor activity is agitated.  Eye contact is somewhat avoidant.  Mood is fairly euthymic with affect that is congruent with content of speech.  Speech/language is rapid; attention, concentration brief; thought process is illogical, and thought content appears within normal limits.  Orientation is x3 with no evidence of impairment.  Memory difficult to assess although patient appears to be a poor historian.  Judgement appears severly impaired.  Estimated intelligence is below average.  Demonstrated insight is diminished with fund of knowledge appearing inadequate.     Suicidal/Homicidal Ideation present: None Reported This Session    Patient's impression of their current status: Patient today presents to session reporting she met with her significant other since previous session, whom she believes is currently homeless and likely using substances, gave him money, and hasn't heard from him since.  While patient initially states she has decided she cannot focus on this, she ultimately vocalizes defense of her significant other, feeling he realizes he is currently unhealthy and is therefore choosing not to expose patient to this unhealthiness.    Therapist impression of patients current state: Patient  presents to session with improved mood since previous sessions, and writer recommends patient's sessions continue to take place in afternoon per staff's recommendation.  Patient today showed a glimpse of insight re: the need to refocus her attention away from significant other but ultimately returned to a state of defense re: her experience of his behaviors, allowing herself to believe her significant other's actions are protective vs avoidant.    Type of psychotherapeutic technique provided: Client centered    Progress toward short term goals:Patient's insight remains limited    Review of long term goals: Not done at today's visit    Diagnosis:   1. Polysubstance dependence    2. Mood disorder in conditions classified elsewhere    3. Intellectual disability        Plan and Follow up: Patient to return in 2 weeks; patient to comply with expectations of crisis residence      Discharge Criteria/Planning: Writer will continue to assess patient's ability to successfully participate in therapy    Paz Foster 4/7/2017      This note was created with help of Dragon dictation software. Grammatical / typing errors are not intentional and inherent to the software.

## 2021-06-09 NOTE — PROGRESS NOTES
Patient presented to Diagnostic Assessment with [facility] employee .  Patient did not present with completed paperwork and therefore spent first 45 minutes of session working with  to complete Adult Intake Questionnaire.  Patient did sign SANJANA [facility] 7 years [facility].  Per patient, Dr. Hi is currently prescribing her medications and her primary motivation for coming to clinic is for medication management vs psychotherapy.  Patient presented as resistant towards writer and agitated in general towards participating in the necessary steps to begin receiving services in clinic.  Although patient did complete Adult Intake Questionnaire, answers were limited and without detail.  As patient  was resistant and agitated, writer gathered some information and as identified received signature on ROIs.  Additionally, patient denies suicidal or homicidal ideation and per , patient has 24 hour a day supervision at the crisis residence and procedure is in place with the employees of the home should a crisis situation arise.   Follow up was rescheduled for 1 week with psychiatry intake also being scheduled with Dr. Díaz.

## 2021-06-09 NOTE — PROGRESS NOTES
Patient presented to second appointment for completion of Diagnostic Assessment.   Patient was able to provide writer with additional information to completion Adult Intake Questionnaire.  However, due to patient's complicated history and observed difficulty providing her history, writer has requested further information from patient's current residence, whom writer has a SANJANA for, regarding dates and locations of past care and treatment.  Therefore, Diagnostic Assessment will require additional sessions for completion.  Patient will return in one week.

## 2021-06-10 NOTE — PROGRESS NOTES
Patient here today to initiate psychiatric care. Denies depression, anxiety 5/5. States she gets a lot of sleep a night and has not trouble with falling asleep or waking up.
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
## 2021-06-10 NOTE — PROGRESS NOTES
Correct pharmacy verified with patient and confirmed in snapshot? [x] yes []no    Medications Phoned  to Pharmacy [] yes [x]no  Name of Pharmacist:  List Medications, including dose, quantity and instructions      Medication Prescriptions given to patient   [] yes  [x] no   List the name of the drug the prescription was written for.       Medications ordered this visit were e-scribed.  Verified by order class [] yes  [x] no    Medication changes or discontinuations were communicated to patient's pharmacy: [] yes  [x] no    UA collected [] yes    [x] no    [place] Prescription Monitoring Program Reviewed? [] yes  [x] no    Referrals were made to: none    Future appointment was made: [] yes  [x] no    Dictation completed at time of chart check: [x] yes  [] no    I have checked the documentation for today s encounters and the above information has been reviewed and completed.
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
## 2021-06-11 NOTE — PROGRESS NOTES
"Called patient at home due to no show.  Home number rang without voicemail, cell number a man picked up who said patient has \"moved out\".  Tameka Littlejohn, LICSW    "

## 2021-06-11 NOTE — PROGRESS NOTES
Mental Health Visit Note    6/5/2017    Start time: 1407    Stop Time: 1447 Session # 6    Abelino Choi is a 42 y.o. female is being seen today for    Chief Complaint   Patient presents with     Follow-up     Follow up in regards to ongoing symptom managemetn of polysubstance dependence, mood concerns, and intellectual disability.        New symptoms or complaints: None    Functional Impairment:   Personal: 4  Family: 4  Work: unemployed  Social:4    Clinical assessment of mental status:  Patient presents to appointment well groomed, wearing appropriate attire, and appearing stated age.  Behavior towards writer is fairly cooperative; ongoing afternoon vs morning sessions are recommended.  Motor activity is agitated.  Eye contact is somewhat avoidant.  Mood is fairly euthymic, anxious at times, with affect that is congruent with content of speech.  Speech/language is rapid; attention, concentration brief; thought process is illogical, and thought content appears within normal limits.  Orientation is x3 with no evidence of impairment.  Memory difficult to assess although patient appears to be a poor historian.  Judgement appears severly impaired.  Estimated intelligence is below average.  Demonstrated insight is diminished with fund of knowledge appearing inadequate.     Suicidal/Homicidal Ideation present: None Reported This Session     Patient's impression of their current status: Patient presents to session reporting excitement about the possibility of an apartment.  She also reports she is now receiving two hour visits with her son.  Patient continues to see her significant other, whom she states she is now engaged to.    Therapist impression of patients current state: Writer today discusses with patient the above identified events.  Patient today continues to present as defensive regarding her relationship with her significant other.  Patient is aware of upcoming transfer to [facility] and is  agreeable to this plan.    Type of psychotherapeutic technique provided: Client centered    Progress toward short term goals:Patient's insight remains limited    Review of long term goals: Treatment Plan updated    Diagnosis:   1. Polysubstance dependence    2. Mood disorder in conditions classified elsewhere    3. Intellectual disability        Plan and Follow up: Patient will transfer to [facility] for ongoing individual psychotherapy      Discharge Criteria/Planning: Ongoing assessment of patient's ability to participate in therapy will be beneficial    Paz Foster 6/5/2017

## 2021-06-11 NOTE — PROGRESS NOTES
Patient arrived to psychotherapy appt with less than 15 minutes remaining in appt block.  She is in the company of her  who provides contact info.  We discussed attendance policy.  Patient wants to continue care for family stress, anxiety.  KELLE LeiSW

## 2021-06-11 NOTE — PROGRESS NOTES
Outpatient Mental Health Treatment Plan    Name:  Abelino Choi  :  1975  MRN:  [id number]    Treatment Plan:  Initial Treatment Plan  Intake/initial treatment plan date:  17  Benefit and risks and alternatives have been discussed: Yes  Is this treatment appropriate with minimal intrusion/restrictions: Yes  Estimated duration of treatment:  Ongoing  Anticipated frequency of services:  Every 2 weeks  Necessity for frequency: This frequency is needed to establish therapeutic goals and for continuity of care in order to monitor progress.  Necessity for treatment: To address cognitive, behavioral, and/or emotional barriers in order to work toward goals and to improve quality of life.      Plan:           ?   ? Anxiety    Goal:  Decrease signs and symptom of anxiety day to day.   Strategies: ? [x]Learn and practice relaxation techniques and other coping strategies (e.g., thought stopping, reframing, meditation)     ? [x] Increase involvement in meaningful activities     ? [x] Discuss sleep hygiene     ? [x] Explore thoughts and expectations about self and others     ? [x] Identify and monitor triggers for panic/anxiety symptoms     ? [x] Implement physical activity routine (with physician approval)     ? [] Consider introduction of bibliotherapy and/or videos     ? [x] Continue compliance with medical treatment plan (or explore barriers)                                       []         Degree to which this is a problem (1-4): 2  Degree to which goal is met (1-4): 4  Date of Review: 17     ? Depression    Goal:  Decrease signs and symptoms of depression day to day.   Strategies:    ?[x] Decrease social isolation     [x] Increase involvement in meaningful activities     ?[x] Discuss sleep hygiene     ?[x] Explore thoughts and expectations about self and others     ?[x] Process grief (loss of significant person, independence, role, etc.)     ?[x] Assess for suicide risk     ?[] Implement physical  activity routine (with physician approval)     [] Consider introduction of bibliotherapy and/or videos     [x] Continue compliance with medical treatment plan (or explore barriers)       ?  Degree to which this is a problem (1-4): 3  Degree to which goal is met (1-4):4  Date of Review: 6/5/17    Substance use  Goal: Maintain sobriety.   Strategies: ? [x] Consider referral for chemical dependency evaluation     ? [x] Discuss barriers to participating in AA or other peer-facilitated groups         [x] Address environmental factors which may interfere with sobriety     ? [x] Explore short-term versus long-term consequences of use     ? [x] Continue compliance with medical treatment plan (or explore barriers)         ?  Degree to which this is a problem (1-4): 1  Degree to which goal is met (1-4):4  Date of Review: 6/5/17    Functional Impairment: 1=Not at all/Rarely  2=Some days  3=Most Days  4=Every Day   Personal: 2  Family: 3  Social: 1  Work: n/a    Diagnosis:  (EXAMPLE of DSM V) Major depressive disorder, recurrent, moderate; Generalized Anxiety disorder; borderline personality per patient PHI; fibromyalgia, History of breast cancer in remission; Problem with primary relationship.    WHODAS 2.0 12-item version= 12.50%  H1= 5  H2= 0  H3= 3  Clinical assessments completed: WHODAS; PANSI.      Strengths:  Please see Diagnostic Assessment  Limitations:  Please see Diagnostic Assessment  Cultural Considerations: Please see Diagnostic Assessment    Persons responsible for this plan:  ? [x] Patient ? [x] Provider ? [] Other: __________________      Provider: SHIRLEY Gamboa, LICSW 6/5/2017    Date:  6/5/2017  Time:  2:25 PM        Patient Signature:____________________________________ Date: ______________     Guardian Signature: __________________________________ Date: ______________     Therapist Signature: __________________________________ Date: ______________

## 2021-06-12 NOTE — PROGRESS NOTES
"Mental Health Visit Note    8/31/2017    Start time: 3:12pm    Stop Time: 4pm (48 minutes)   Session # 8    Abelino Choi is a 42 y.o. female is being seen today for    Chief Complaint   Patient presents with      Follow Up     Anxiety     Addiction   .     New symptoms or complaints: complains of feeling trapped, agitated, frustrated.    Functional Impairment:   Personal: 4  Family: 4  Work: unemployed  Social:4     Clinical assessment of mental status:  Patient presents to appointment well groomed, wearing appropriate attire, and appearing stated age.  Behavior towards writer is fairly cooperative; ongoing afternoon vs morning sessions are recommended.  Motor activity is agitated.  Eye contact is somewhat avoidant.  Mood is fairly euthymic, anxious at times, with affect that is congruent with content of speech.  Speech/language is rapid; attention, concentration brief; thought process is illogical, and thought content appears within normal limits.  Orientation is x3 with no evidence of impairment.  Memory difficult to assess although patient appears to be a poor historian.  Judgement appears moderately impaired.  Estimated intelligence is below average.  Demonstrated insight is diminished with fund of knowledge appearing inadequate.      Suicidal/Homicidal Ideation present: None Reported This Session    Patient's impression of their current status:    Patient reports general anxiety and irritability.  Group home staff with her indicate that she's been working hard to limit outbursts.  \"I need to get on some better meds.  I was only taking Zoloft because I was pregnant.  I need Visteril for anxiety.\"   Patient had no psychiatry visit scheduled and was encouraged to do so.  She did speak with her  about her concerns about her living situation.  Has remained sober, attended parenting class as required.    Processed negative cognitions, reinforced strengths, validated patient efforts. Offered " empathic listening and reflections and questions intended to evoke change talk, explored needs and resources, and provided emotional support.   Patient able to identify need for releases for her son's .      Therapist impression of patients current state: Mood instability noted but patient holding her own with substantial support from 1:1 staffing at group home.  Difficult to ascertain if her mood issues are personality driven only. (Under care of previous therapist, diagnosis incliuded Major Depression disorder, recurrent, Generalized anxiety disorder, Borderline personality disorder.)    Type of psychotherapeutic technique provided: Client centered, Solution-focused and CBT    Progress toward short term goals:Progress as expected, spoke with  about her housing situation, discussed anxiety with group home stafff.    Review of long term goals: Not done at today's visit and Date of last review 6/5/17    Diagnosis:   1. Bipolar disorder, unspecified    2. Mood disorder in conditions classified elsewhere    3. Borderline personality disorder    4. Methamphetamine dependence in remission    5. Polysubstance dependence    6. Intellectual disability        Plan and Follow up: Patient will return in two weeks for follow up.  She will follow up with psychiatry.  Will remain sober, attend parenting class as required.    Discharge Criteria/Planning: Client has chronic symptoms and ongoing therapy for maintenance stability recommended.    Tameka Littlejohn MSW, [facility]  8/31/2017      This note was created with help of Dragon dictation software. Grammatical / typing errors are not intentional and inherent to the software.

## 2021-06-12 NOTE — PROGRESS NOTES
"Mental Health Visit Note    8/10/2017    Start time: 3:12pm    Stop Time: 4pm (48 minutes)   Session # 7    Abelino Choi is a 42 y.o. female is being seen today for    Chief Complaint   Patient presents with      Follow Up     Anxiety     Depression     Addiction   .     New symptoms or complaints: complains of feeling trapped, agitated, frustrated.    Functional Impairment:   Personal: 4  Family: 4  Work: unemployed  Social:4     Clinical assessment of mental status:  Patient presents to appointment well groomed, wearing appropriate attire, and appearing stated age.  Behavior towards writer is fairly cooperative; ongoing afternoon vs morning sessions are recommended.  Motor activity is agitated.  Eye contact is somewhat avoidant.  Mood is fairly euthymic, anxious at times, with affect that is congruent with content of speech.  Speech/language is rapid; attention, concentration brief; thought process is illogical, and thought content appears within normal limits.  Orientation is x3 with no evidence of impairment.  Memory difficult to assess although patient appears to be a poor historian.  Judgement appears moderately impaired.  Estimated intelligence is below average.  Demonstrated insight is diminished with fund of knowledge appearing inadequate.      Suicidal/Homicidal Ideation present: None Reported This Session    Patient's impression of their current status:    Asked about mood, patient complains \"I feel trapped, like I'm in a box!\".  She says her group living facility has a live-in staff in her apartment with her (aparent 1:1 staffing) but this does not leave room for her 8 month old son who she wants to bring home from foster care.  She reports a visit from Child Protection where she felt that this was a concern.   Processes further her concerns about \"my man\" who has gotten out of care home, on probation, no where to live.  He is the father of her young son.  Patient states she has been sober x 1 " "year.     Processed negative cognitions, reinforced strengths, validated patient efforts.  Attempted to discuss what has made it possible for patient to remain sober x1 year, patient states \"I just did it!\"  Offered empathic listening and reflections and questions intended to evoke change talk, explored needs and resources, and provided emotional support.   Patient able to identify goals to take care of Depo shot, PAP, dentist. Admits she has dental problems from methamphetamine use.  She agrees to speak with her  about her concerns about her living situation.  Will remain sober, attend parenting class as required.    Therapist impression of patients current state: This level of agitation and reactivity may be baseline for patient.  Suspect some rapid cycling in mood.    Reviewed patient's prior MH care with previous therapist, diagnosis incliuded Major Depression disorder, recurrent, Generalized anxiety disorder, Borderline personality disorder.    Type of psychotherapeutic technique provided: Client centered, Solution-focused and CBT    Progress toward short term goals:First session with new therapist.    Review of long term goals: Not done at today's visit and Date of last review 6/5/17    Diagnosis:   1. Bipolar disorder, unspecified    2. Methamphetamine dependence in remission        Plan and Follow up: Patient will return in two weeks for follow up.  She agrees to speak with her  about her concerns about her living situation.  Will remain sober, attend parenting class as required.    Discharge Criteria/Planning: Client has chronic symptoms and ongoing therapy for maintenance stability recommended.    Taemka Littlejohn MSW, [facility]  8/10/2017      This note was created with help of Dragon dictation software. Grammatical / typing errors are not intentional and inherent to the software.  "

## 2021-06-13 NOTE — PROGRESS NOTES
"Mental Health Visit Note    10/26/2017    Start time: 4:05pm    Stop Time: 5pm   Session # 11    Abelino Choi is a 42 y.o. female is being seen today for    Chief Complaint   Patient presents with      Follow Up     Depression     Anxiety     Addiction   .     New symptoms or complaints: Feels anxious and trapped by living in [place] without buses.    Functional Impairment:   Personal: 4  Family: 3  Work: 4   Social: 3     Clinical assessment of mental status:  Patient presents to appointment well groomed, wearing appropriate attire, and appearing stated age.  Behavior towards writer is fairly cooperative; ongoing afternoon vs morning sessions are recommended.  Motor activity is agitated.  Eye contact is somewhat avoidant.  Mood is fairly euthymic, anxious at times, with affect that is congruent with content of speech.  Speech/language is rapid; attention, concentration brief; thought process is somewhat illogical, and thought content appears within normal limits.  Orientation is x3 with no evidence of impairment.  Memory difficult to assess although patient appears to be a poor historian.  Judgement appears moderately impaired.  Estimated intelligence is below average.  Demonstrated insight is diminished with fund of knowledge appearing inadequate.      Suicidal/Homicidal Ideation present: None Reported This Session    Patient's impression of their current status:      \"I'm so fucking tired of not having my space and my freedom!\".  Complains that she has too many different 1:1 staff \"that don't know my appointments\".  She now has to pay more for rent and her spending $ has been reduced to $97.  She has not been allowed to have her boyfriend overnight. She as been threatened about her \"attitude\" that she may not be allowed to have him at all.  \"I want CPS off my back, I want to get my son home, I don't use drugs and I don't deserve this!\"  Asked about the story of why her son was taken from her, she says " "it was because she was asleep with baby under the cover, dirty diapers in the room.  \"He was fine!\"    Continues sober from mood altering substances per her report. Her 1:1 staff was unable to bring her to  as discussed.  She followed through on dentristry appt: has 15 cavities and 4 teeth have to be pulled.  Practiced self-care with walks, alone time as agreed.    Processed negative cognitions, reinforced strengths, validated patient efforts and feelings. We discussed that it will be important to understand how she ended up where she is in order to be more in control of what is next.  She agrees to call her  with her concerns about housing and programs, we agreed that I would also call her  to have a better understanding about patient's obligations and identified vulnerabilities.  Patient agrees to remain substance-free.      Therapist impression of patients current state: Clear difficulty with emotional regulation. Poor insight into why she has so many restrictions (commitment, CPS). Managing sobriety with lots of supervision from 1:1 staff and structure of group home.    Type of psychotherapeutic technique provided: Client centered, Solution-focused and CBT    Progress toward short term goals:Progress as expected,  Continues sober from mood altering substances per her report. She followed through on dentristry appt and practiced self-care with walks, alone time.  Her 1:1 staff was unable to bring her to  as discussed.    Review of long term goals: Not done at today's visit and Date of last review 9/14/2017    Diagnosis:   1. Episodic mood disorder    2. Borderline personality disorder    3. Methamphetamine dependence in remission        Plan and Follow up: Patient will return in two weeks for follow up.  She agrees to call her  with her concerns about housing and programs, we agreed that I would also call her  to have a better understanding about patient's " obligations and identified vulnerabilities.  Patient agrees to remain substance-free.      Discharge Criteria/Planning: Client has chronic symptoms and ongoing therapy for maintenance stability recommended.    Tameka Littlejohn MSW, LICSW  10/26/2017

## 2021-06-13 NOTE — PROGRESS NOTES
Outpatient Mental Health Treatment Plan    Name:  Abelino Choi  :  1975  MRN:  [id number]    Treatment Plan:  Initial Treatment Plan  Intake/initial treatment plan date:  17  Benefit and risks and alternatives have been discussed: Yes  Is this treatment appropriate with minimal intrusion/restrictions: Yes  Estimated duration of treatment:  Ongoing  Anticipated frequency of services:  Every 2 weeks  Necessity for frequency: This frequency is needed to establish therapeutic goals and for continuity of care in order to monitor progress.  Necessity for treatment: To address cognitive, behavioral, and/or emotional barriers in order to work toward goals and to improve quality of life.      Plan:           ?   ? Anxiety    Goal:  Decrease symptoms from 7 to 3 overall distress score.   Strategies: ? [x]Learn and practice relaxation techniques and other coping strategies (e.g., thought stopping, reframing, meditation)     ? [x] Increase involvement in meaningful activities     ? [x] Discuss sleep hygiene     ? [x] Explore thoughts and expectations about self and others     ? [x] Identify and monitor triggers for panic/anxiety symptoms     ? [x] Implement physical activity routine (with physician approval)     ? [] Consider introduction of bibliotherapy and/or videos     ? [x] Continue compliance with medical treatment plan (or explore barriers)                                       []         Degree to which this is a problem (1-4): 3  Degree to which goal is met (1-4): 1  Date of Review: 2017     ? Depression    Goal:  Decrease signs and symptoms of depression day to day from overall distress level from 5 to 2-3.   Strategies:    ?[x] Decrease social isolation     [x] Increase involvement in meaningful activities     ?[x] Discuss sleep hygiene     ?[x] Explore thoughts and expectations about self and others     ?[x] Process grief (loss of significant person, independence, role, etc.)     ?[x] Assess  "for suicide risk     ?[] Implement physical activity routine (with physician approval)  [patient reports she walks regularly]     [] Consider introduction of bibliotherapy and/or videos     [x] Continue compliance with medical treatment plan (or explore barriers)       ?  Degree to which this is a problem (1-4): 3  Degree to which goal is met (1-4): 1  Date of Review: 9/14/2017    Substance use  Goal: Maintain sobriety.  Has been sober about a year and a half.  She struggles to utilize sobriety support because her community in [place] does not have bus service.   Strategies: ? [] Consider referral for chemical dependency evaluation     ? [x] Discuss barriers to participating in AA or other peer-facilitated groups         [x] Address environmental factors which may interfere with sobriety     ? [x] Explore short-term versus long-term consequences of use     ? [x] Continue compliance with medical treatment plan (or explore barriers)         ?  Degree to which this is a problem (1-4): 1  Degree to which goal is met (1-4): 3  Date of Review: 9/14/2017      Functional Impairment: 1=Not at all/Rarely  2=Some days  3=Most Days  4=Every Day   Personal: 2  Family: 3  Social: 1  Work: n/a    Diagnosis:  Major depressive disorder, recurrent, moderate; Generalized Anxiety disorder; borderline personality by history    WHODAS 2.0 12-item version= 12.50%  H1= 5  H2= 0  H3= 3  Clinical assessments completed: Patient declines assessment scales today \"I can't concentrate on them\".  Willing to discuss her overall sense of safety, denies suicidal ideation, plan, or intent.  CAGE is 0 of 4 with no substance use for about 1.5 years.  (Is frustrated she's not able to get to NA meetings.0    Strengths:  Likes to have fun, making efforts at controlling her temper, accepting of help, sober from drugs and alcohol.  Limitations:  Addiction, irritability, anxiety  Cultural Considerations: Patient does not want to discuss the culture she grew " "up in , but indicates it was \"hard\".    Persons responsible for this plan:  ? [x] Patient ? [x] Provider ? [] Other: __________________      Provider: Tameka Littlejohn [facility] 9/14/2017    Date:  9/14/2017          Patient Signature:____________________________________ Date: ______________     Guardian Signature: __________________________________ Date: ______________     Therapist Signature: __________________________________ Date: ______________      "

## 2021-06-13 NOTE — PROGRESS NOTES
"Mental Health Visit Note    9/14/2017    Start time: 4:05pm    Stop Time: 5pm   Session # 9    Abelino Choi is a 42 y.o. female is being seen today for    Chief Complaint   Patient presents with     Follow-up     Anxiety     Depression     Addiction   .     New symptoms or complaints: complains of feeling trapped, agitated, frustrated.    Functional Impairment:   Personal: 3  Family: 4  Work: 4   Social: 3     Clinical assessment of mental status:  Patient presents to appointment well groomed, wearing appropriate attire, and appearing stated age.  Behavior towards writer is fairly cooperative; ongoing afternoon vs morning sessions are recommended.  Motor activity is agitated.  Eye contact is somewhat avoidant.  Mood is fairly euthymic, anxious at times, with affect that is congruent with content of speech.  Speech/language is rapid; attention, concentration brief; thought process is illogical, and thought content appears within normal limits.  Orientation is x3 with no evidence of impairment.  Memory difficult to assess although patient appears to be a poor historian.  Judgement appears moderately impaired.  Estimated intelligence is below average.  Demonstrated insight is diminished with fund of knowledge appearing inadequate.      Suicidal/Homicidal Ideation present: None Reported This Session    Patient's impression of their current status:    \"I'm OK but I have a cold.\"  Her sense of being penned in has alleviated, and she feels she is allowed some privacy from her 1:1 staff.  She is walking, enjoying occasional movie. Attends parenting class weekly where she sees her son. She is happy that he seems happy and healthy. She is   She is attending NA occasionally. She is not able to get there regularly because of the group home not having a car, there are no buses.    She followed through with seeing her doctor depo shot, tetnus, and flu shot. She is attempting to set up dentistry.     Processed negative " cognitions, reinforced strengths, validated patient efforts. We discussed the value of sober support.   She will follow through on dentristry appt and practice self-care with walks, alone time.  Will be free of mood altering substances, see about attending NA via rides from staff.    Therapist impression of patients current state: Mood issues somewhat improved from last session.    Type of psychotherapeutic technique provided: Client centered, Solution-focused and CBT    Progress toward short term goals:Progress as expected, She is walking, enjoying occasional movie. Attends parenting class weekly where she sees her son.  She is     Review of long term goals: Long-term goals reviewed   and Treatment Plan updated    Diagnosis:   1. Bipolar disorder, unspecified    2. Mood disorder in conditions classified elsewhere    3. Borderline personality disorder    4. Methamphetamine dependence in remission    5. Polysubstance dependence    6. Intellectual disability        Plan and Follow up: Patient will return in two weeks for follow up.  She will follow through on dentristry appt and practice self-care with walks, alone time.  Will be free of mood altering substances, see about attending NA via rides from staff.    Discharge Criteria/Planning: Client has chronic symptoms and ongoing therapy for maintenance stability recommended.    Tameka Littlejohn MSW, LICSW  9/14/2017      This note was created with help of Dragon dictation software. Grammatical / typing errors are not intentional and inherent to the software.

## 2021-06-13 NOTE — PROGRESS NOTES
Patient here today for follow up of medication management. States has been sober for over a year now. States depression is up and down, denies SI/HI, anxiety is bad. States sleep is about 5-6 hours a night with a lot of nightmares.

## 2021-06-13 NOTE — PROGRESS NOTES
Correct pharmacy verified with patient and confirmed in snapshot? [x] yes []no    Charge captured ? [x] yes  [] no    Medications Phoned  to Pharmacy [] yes [x]no  Name of Pharmacist:  List Medications, including dose, quantity and instructions      Medication Prescriptions given to patient   [] yes  [x] no   List the name of the drug the prescription was written for.       Medications ordered this visit were e-scribed.  Verified by order class [x] yes  [] no  Lexapro 20 mg  Hydroxyzine 25 mg    Medication changes or discontinuations were communicated to patient's pharmacy: [] yes  [x] no    UA collected [] yes  [x] no    [place] Prescription Monitoring Program Reviewed? [] yes  [x] no    Referrals were made to:none      Future appointment was made: [x] yes  [] no    Dictation completed at time of chart check: [x] yes  [] no    I have checked the documentation for today s encounters and the above information has been reviewed and completed.

## 2021-06-13 NOTE — PROGRESS NOTES
"Mental Health Visit Note    9/28/2017    Start time: 4:05pm    Stop Time: 5pm   Session # 10    Abelino Choi is a 42 y.o. female is being seen today for    Chief Complaint   Patient presents with      Follow Up     Anxiety     Depression     Addiction   .     New symptoms or complaints: Feels anxious and trapped by living in [place] without buses.    Functional Impairment:   Personal: 4  Family: 3  Work: 4   Social: 3     Clinical assessment of mental status:  Patient presents to appointment well groomed, wearing appropriate attire, and appearing stated age.  Behavior towards writer is fairly cooperative; ongoing afternoon vs morning sessions are recommended.  Motor activity is agitated.  Eye contact is somewhat avoidant.  Mood is fairly euthymic, anxious at times, with affect that is congruent with content of speech.  Speech/language is rapid; attention, concentration brief; thought process is somewhat illogical, and thought content appears within normal limits.  Orientation is x3 with no evidence of impairment.  Memory difficult to assess although patient appears to be a poor historian.  Judgement appears moderately impaired.  Estimated intelligence is below average.  Demonstrated insight is diminished with fund of knowledge appearing inadequate.      Suicidal/Homicidal Ideation present: None Reported This Session    Patient's impression of their current status:    Patient reports \"I'm anxious!\"  Processes her frustration that she's unable to go to her own NA meeting because the group home where she lives is in [place] where there are no buses.  There's a meeting near her \"but it's closed too much of the time.\"    Describes her hx of living in group homes for 17 years, living a short time with her boyfriend in an cockroach infested building, now back in a group home.      She continues to attend parenting group, but upset that she's not had all the access to her son that she's wanted and feels \"they are " "writing things that are critical that just aren't true\". Her next court date for child protection is October 5. \"My child is an infant and can't really play, they shouldn't expect that.\"    Processes stressors in relationship with boyfriend  \"I get of the phone with him and 5 minutes later I\"m blowing up his phone.\"  \"I have to stop doing that.\"    She followed through on dentristry appt and practice self-care with walks, alone time.  Was free of mood altering substances, unable to nail down rides to NA (apparently something she may not have control over).     Processed negative cognitions, reinforced strengths, validated patient efforts and feelings. Offered empathic listening and reflections and questions intended to evoke change talk, explored needs and resources, and provided emotional support.   Patient identifies goal to have her own place, wants to be able to have her youngest child with her. Assisted patient in brainstorming next steps to address her stressors.   She agrees to reach out to  Barry about her desire to live [place] the city and that she wants to meet the terms of her commitment by attending .  She will talk to Barry about getting on some subsidized housing.    Therapist impression of patients current state: Mood issues somewhat improved from last session, clear difficulty with emotional regulation.    Type of psychotherapeutic technique provided: Client centered, Solution-focused and CBT    Progress toward short term goals:Progress as expected, She followed through on dentristry appt and practice self-care with walks, alone time.  Was free of mood altering substances, unable to nail down rides to NA (apparently something she may not have control over).    Review of long term goals: Long-term goals reviewed   and Treatment Plan updated    Diagnosis:   1. Episodic mood disorder    2. Borderline personality disorder    3. Methamphetamine dependence in remission    4. Intellectual " disability        Plan and Follow up: Patient will return in two weeks for follow up.  She will follow through on dentristry appt and practice self-care with walks, alone time.  Will be free of mood altering substances, see about attending NA via rides from staff.    Discharge Criteria/Planning: Client has chronic symptoms and ongoing therapy for maintenance stability recommended.    Tameka Littlejohn MSW, LICSW  9/28/2017      This note was created with help of Dragon dictation software. Grammatical / typing errors are not intentional and inherent to the software.

## 2021-06-14 NOTE — PROGRESS NOTES
Called patient at home x2 regarding no-show 12/7 (and 11/9), left message asking for return call and did not hear back.  Called patient's  ([facility] on file, this patient has 24/7 staffing and is dependent on group home staff for transportation, suspect also dependent on staff help for organizing her rides/appts).  Spoke with  Sheri Villanueva, [phone], who states that patient's no shows were because of last minute difficulty with vehicle availability (shared between group homes).  Sheri was warned about our no-show policy at the clinic, and writer requested 24 hour notice when patient will not be able to come.  Patient's CADI  Barry Vargas ([phone]) was also notified ([facility] on file) about difficulties and advised that another option would be to locate therapist that is closer to patient's home in [place].  Tameka Littlejohn, KELLESW

## 2021-06-14 NOTE — PROGRESS NOTES
Called head of patient's group home Erick Aquino [phone] regarding concern about patient's no-show for therapy again (#3 - not patient's fault as group home is responsible for transport to appts).  Shared discussion with Sheri Villanueva, who had promised patient would be here today.  Reminded him of patient's upcoming psychiatry appt - she has had two no shows and could risk losing that care.  Recommended they find therapy and psychiatry under one roof closer to group home since Dr. Díaz is leaving this practice.  Attempted to call patient with same info - she did not  phone.    KELLE LeiSW

## 2021-06-15 NOTE — PROGRESS NOTES
Pt states she is unhappy with her current residence. Pt states she has been clean and sober. Pt states that she wants to move to a new residence that focuses on her and her family getting well together. Pt states that she wants to get on Abilify to help her sleep.      Correct pharmacy verified with patient and confirmed in snapshot? [x] yes []no    Medications Phoned  to Pharmacy [] yes [x]no  Name of Pharmacist:  List Medications, including dose, quantity and instructions      Medication Prescriptions given to patient   [] yes  [x] no   List the name of the drug the prescription was written for.      Medications ordered this visit were e-scribed.  Verified by order class [] yes  [x] no      Medication changes or discontinuations were communicated to patient's pharmacy:  [] yes  [x] no  Pharmacist Spoke With:     UA collected [] yes  [x] no    [place] Prescription Monitoring Program Reviewed? [x] yes  [] no    Referrals/Labs were made to:     Completed Charge Capture?  [x] yes  [] no    Future appointment was made: [x] yes  [] no  3/12/18  Dictation completed at time of chart check: [x] yes  [] no    I have checked the documentation for today s encounters and the above information has been reviewed and completed.

## 2021-06-15 NOTE — PROGRESS NOTES
Called patient with reminder for today's 4pm appt, warned of clinic no show policy.  She referred writer to  [facility] because she is dependent on them for transport.    Called [facility] ([phone], SANJANA in chart already) of today's appt and need to take patient off schedule if she is another no-show.    [facility] explains that two of the no-shows were due to their difficulty with van transportation, and only the last one because of patient refusal to attend.    [facility] asks for change of schedule because 4pm is difficult timing for them and because city rush hour driving difficult for their drivers.  Writer located 11am slot starting 2/8, emphasized again that patient must make appts to stay on clinic schedule.  We discussed that psychiatry will be transitioning in March per letter sent by clinic ([facility] unaware of this letter) to a different provider.  Discussed that it would be perfectly reasonable for patient to be seen by psychiatry and psychotherapist closer to the group home in [place], and they can work with patient's  to complete transfer if desired.  [facility] agrees to call writer for any inabilty to make future appts along with the reason why the appt is missed (e.g patient illness, refusal, or staff inability for transport).  Changes as above made to schedule.  KELLE LeiSW

## 2021-06-15 NOTE — PROGRESS NOTES
Outpatient Mental Health Treatment Plan    Name:  Abelino Choi  :  1975  MRN:  [id number]    Treatment Plan:  Updated Treatment Plan 2018  Intake/initial treatment plan date:  17  Benefit and risks and alternatives have been discussed: Yes  Is this treatment appropriate with minimal intrusion/restrictions: Yes  Estimated duration of treatment:  Ongoing  Anticipated frequency of services:  Every 2 weeks  Necessity for frequency: This frequency is needed to establish therapeutic goals and for continuity of care in order to monitor progress.  Necessity for treatment: To address cognitive, behavioral, and/or emotional barriers in order to work toward goals and to improve quality of life.      Plan:           ?   ? Anxiety    Goal:  Decrease symptoms from 7 to 3 overall distress score.   Strategies: ? [x]Learn and practice relaxation techniques and other coping strategies (e.g., thought stopping, reframing, meditation)     ? [x] Increase involvement in meaningful activities     ? [x] Discuss sleep hygiene     ? [x] Explore thoughts and expectations about self and others     ? [x] Identify and monitor triggers for panic/anxiety symptoms     ? [x] Implement physical activity routine (with physician approval)     ? [] Consider introduction of bibliotherapy and/or videos     ? [x] Continue compliance with medical treatment plan (or explore barriers)                                       []         Degree to which this is a problem (1-4): 3  Degree to which goal is met (1-4): 1  Date of Review: 2018     ? Depression    Goal:  Decrease signs and symptoms of depression day to day from overall distress level from 5 to 2-3.   Strategies:    ?[x] Decrease social isolation     [x] Increase involvement in meaningful activities     ?[x] Discuss sleep hygiene     ?[x] Explore thoughts and expectations about self and others     ?[x] Process grief (loss of significant person, independence, role, etc.)     ?[x]  "Assess for suicide risk     ?[] Implement physical activity routine (with physician approval)  [patient reports she walks regularly]     [] Consider introduction of bibliotherapy and/or videos     [x] Continue compliance with medical treatment plan (or explore barriers)       ?  Degree to which this is a problem (1-4): 3  Degree to which goal is met (1-4): 1  Date of Review: 2/8/2018    Substance use  Goal: Maintain sobriety.  Has had two slips after she's been sober about a year and a half.  She struggles to utilize sobriety support because her community in [place] does not have bus service.   Strategies: ? [] Consider referral for chemical dependency evaluation     ? [x] Discuss barriers to participating in AA or other peer-facilitated groups         [x] Address environmental factors which may interfere with sobriety     ? [x] Explore short-term versus long-term consequences of use     ? [x] Continue compliance with medical treatment plan (or explore barriers)         ?  Degree to which this is a problem (1-4): 3  Degree to which goal is met (1-4): 1  Date of Review: 2/8/2018      Functional Impairment: 1=Not at all/Rarely  2=Some days  3=Most Days  4=Every Day   Personal: 2  Family: 3  Social: 1  Work: n/a    Diagnosis:  Major depressive disorder, recurrent, moderate; Generalized Anxiety disorder; borderline personality by history    WHODAS 2.0 12-item version= 12.50%  H1= 5  H2= 0  H3= 3  Clinical assessments completed: Patient declines assessment scales today \"I can't concentrate on them\".  Willing to discuss her overall sense of safety, denies suicidal ideation, plan, or intent. (EDIT 2/22/18: Patient declined CAGE, but acknowledged that she's relapsed on street drugs and needs to be sober for the sake of her son.)  Strengths:  Likes to have fun, making efforts at controlling her temper, accepting of help, sober from drugs and alcohol.  Limitations:  Addiction, irritability, anxiety  Cultural Considerations: " "Patient does not want to discuss the culture she grew up in , but indicates it was \"hard\".    Persons responsible for this plan:  ? [x] Patient ? [x] Provider ? [] Other: __________________      Provider: KACY Lei 2/8/2018    Date:  2/8/2018          Patient Signature:____________________________________ Date: ______________     Guardian Signature: __________________________________ Date: ______________     Therapist Signature: __________________________________ Date: ______________      "
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
## 2021-06-16 NOTE — PROGRESS NOTES
Patient here today for follow up of medication management. States she has not been sleeping during the night and day sleeping about 6-7 hours. States anxiety is 6/10, depression 8/10 denies SI/HI. Had relapsed on Meth a few weeks ago.

## 2021-06-16 NOTE — PROGRESS NOTES
"Mental Health Visit Note    3/22/2018    Start time: 11:10am    Stop Time: 12:05pm   Session #3    Abelino Choi is a 43 y.o. female is being seen today for    Chief Complaint   Patient presents with      Follow Up     Anxiety     Depression     Addiction   .     New symptoms or complaints: None    Functional Impairment:   Personal: 3  Family: 3  Work: 4   Social: 3     Clinical assessment of mental status:  Patient presents to appointment well groomed, wearing appropriate attire, and appearing stated age.  Behavior towards writer is fairly cooperative.  Motor activity is mildly agitated.  Eye contact is somewhat avoidant. Mood is irritible, anxious at times, with affect that is congruent with content of speech.  Speech/language is rapid and pressured; attention and concentration brief; thought process is somewhat illogical, and thought content appears within normal limits. Orientation is x3 with no evidence of impairment.  Memory difficult to assess although patient appears to be a poor historian.  Judgement appears moderately impaired.  Estimated intelligence is below average.  Demonstrated insight is diminished with fund of knowledge appearing inadequate.      Suicidal/Homicidal Ideation present: None Reported This Session    Patient's impression of their current status:   Patient identifies mood as irritable and stressed. \"Don't talk to me about my sobriety, I know what I need to do\".   \"Don't talk to me about Jose Juan, you all are just pushing him away.\"  Expresses her belief that her boyfriend will be a better person when he's eventually \"home\" with her.   Still struggling stress and pain with dental issues.     Followed up with therapy homework: met with her  with her concerns about housing and programs. Able to identify positive that she is moving to new apartment in [place] and she'll be able to go to the stores she likes. Able to identify sober support/AA near new home. Patient utilized " "her self-soothing skills of hot baths, coloring, walks, deep breathing for self-destructive impulses or desire to use drugs x3.  Vague about whether she was able to remain sober.  Following through with dental appts.       Therapist impression of patients current state: Some mood lability with multiple stressors, making use of supportive therapy - is not requiring ED or inpatient MH.  Moving to the city will likely increase risk of relapse with drug access but will also hopefully provide better options for recovery and sober social support.  She appears to have some insight as to risk of her son not being placed with her permanently.    Processed negative cognitions, reinforced strengths, validated patient efforts and feelings. Offered empathic listening and reflections and questions intended to evoke change talk, explored needs and resources, and provided emotional support.   Patient identifies goal to show stability her new apartment x1 year.  She wants to prove to [facility] workers that she can do this. Identifies that she can't bring people into her new home that she doesn't trust. \"I don't want Gilberto (young son) in foster care forever. Processes her plan to go to parenting classes near her new home. Discusses her goal to eventually have a dog and a townhouse.   Patient given number for [employer] to help locate parenting classes, food shelf close to her new apartment. Able to identify positive: she has good workers in [facility], Child Protection, and her son's guardian.     Type of psychotherapeutic technique provided: Client centered, Solution-focused and CBT    Progress toward short term goals:Progress as expected,   met with her  with her concerns about housing and programs. Able to identify positive that she is moving to new apartment in [place] and she'll be able to go to the stores she likes. Able to identify sober support/AA near new home. Patient utilized her " self-soothing skills of hot baths, coloring, walks, deep breathing for self-destructive impulses or desire to use drugs x3.  Vague about whether she was able to remain sober.  Following through with dental appts.     Review of long term goals: Not done at today's visit and Date of last review 2/8/2018    Diagnosis:   1. Episodic mood disorder    2. Borderline personality disorder    3. Substance use disorder    4. Intellectual disability    5. Methamphetamine dependence in remission        Plan and Follow up: Patient will return in two weeks for follow up. She will set up [facility] transportation for future therapy appointments. She will identify parenting class, food shelf near her new place. She will identify NA, AA meetings she can attend near her home.  She will remain free of substance use.    Discharge Criteria/Planning: Client has chronic symptoms and ongoing therapy for maintenance stability recommended.    Tameka WATSON, LICSW  3/22/2018
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
## 2021-06-16 NOTE — PROGRESS NOTES
Correct pharmacy verified with patient and confirmed in snapshot? [x] yes []no    Charge captured ? [x] yes  [] no    Medications Phoned  to Pharmacy [] yes [x]no  Name of Pharmacist:  List Medications, including dose, quantity and instructions      Medication Prescriptions given to patient   [] yes  [x] no   List the name of the drug the prescription was written for.       Medications ordered this visit were e-scribed.  Verified by order class [x] yes  [] no  Celexa 40 mg    Medication changes or discontinuations were communicated to patient's pharmacy: [x] yes  [] no  Lexapro 20 mg    UA collected [] yes  [x] no    [place] Pres[place] Monitoring [place] Reviewed? [] yes  [x] no    Referrals were made to:none      Future appointment was made: [x] yes  [] no    Dictation completed at time of chart check: [x] yes  [] no    I have checked the documentation for today s encounters and the above information has been reviewed and completed.

## 2021-06-16 NOTE — PROGRESS NOTES
"Called patient due to no show for therapy.  She says \"my staff isn't here to take me\".  Says she is getting ready for a move to the [facility].  Plans to make it to her next appt in two weeks.  Aware that this patient is dependent on her group home staff for transport (lives in [place]) and organization to make it to appts. Put in call to her [facility]  (SANJANA signed previously) to warn her of possible discharge if patient and staff are unable to come up with regular and dependable transportation and attendance.  Tameka Littlejohn, LICSW    "
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
## 2021-06-18 NOTE — PROGRESS NOTES
"Mental Health Visit Note    5/16/2018    Start time: 12:30    Stop Time: 1:25   Session # 4    Abelino Choi is a 43 y.o. female is being seen today for    Chief Complaint   Patient presents with      Follow Up     Anxiety     anxiety in the context of being homeless, doesn't have phone or $     Depression     SI and depression in the context of lost furniture, cat, at risk of losing custody of two year old son placed in foster care     Addiction     relapse on methamphetamine and cocaine   .     New symptoms or complaints: suicidal ideation, anxiety in context of homelessness, boyfriend issues, possible loss of custody of son in foster care. Drug relapse.    Functional Impairment:   Personal: 4  Family: 4  Work: 4  Social:4    Clinical assessment of mental status: Patient's grooming is Disheveled, attire is Appropriate, and age appears Appears Stated. Behavior towards examiner is Attention seeking, motor activity is Excessive, and eye contact is Tense.  Patient's mood is Anxious, and affect is Labile, Tearful and Anxious.  Speech/language is Excessive, Dramatic, Pressured and Loud, attention is Distractible, and concentration is Brief. Thought process is Tangential, thought content is Within noraml and  Within normal.  Patient's orientation is X 3, memory is  variable, judgement is Impairment and Moderate, and estimated intelligence is Below Average. Demonstrated insight is Diminished while fund of knowledge is adequate.    Suicidal/Homicidal Ideation present: Yes: patient makes suicidal threats \"I'm really going to hurt myself really bad\", \"I want to walk down the street and have a car just hit me\".  Wants to be admitted inpatient.    Patient's impression of their current status:   \"I don't know what to do, I'm scared.\"  \"I'm really going to hurt myself really bad.\" \"I want to walk down the street and have a car just hit me.\"  Processes distress over recent stressors including boyfriend refusing contact x1 " "month, no money or food (spent all of her SSI check on drugs, sold her food stamps), lost her phone. Has been staying in a \"drug house\" (at least until her money ran out).  Processes her distress that her two year old son in foster care will be placed permanently in state custody as she has not been in contact or visited him in foster care x1 month, as well as the loss of her cat and her furniture after she didn't complete her move at the beginning of this month to a new group home in [place] (address is on her current face sheet although patient reportedly never lived there).    Over her visit, patient became more histrionic, laying on the office floor and sobbing loudly. \"I don't know why my mother had me.\"  \"I'm a bad person, a bad mother, a bad girlfriend\".       Therapist impression of patients current state:  Patient showing decompensated mood and behavior consistent with Borderline Personality and borderline IQ, inability to cope with stressors and consequences of her drug seeking behavior.  While her suicidal statements are clearly threats to obtain hospital stay, patient is at risk of self-harm due to limited problem-solving abilities and impulsivity.  Based on earlier visits, suspect boyfriend's absence may have been triggering incident as patient very enmeshed and dependent on the boyfriend who recently was released from jail (he is father of patient's youngest child but has no contact with child).  Patient was more stable when she was on provisional discharge from  commitment and received 1:1 staffing at [facility], but commitment has lapsed, she is discharged from group [facility], has neither MA nor waivered services in place.    Patient seen for urgent care psychotherapy visit following patient being discharged from ED.  Per ED records reviewed, [facility] ED visit was directly preceded by a visit to [facility] ED where she was seen for methamphetamine intoxication and discharged after reported " unwillingness to make a plan for CD treatment.  Patient reported SI in the  ED, saying she wanted to go inpatient MH but was not indicating suicidal plan or intent on that ED visit. Primary problem identified on ED was patient's drug use and she was referred to complete outpatient Rule 25 for CD treatment and discharged. After d/c from  ED, [facility] to end up in outpt MH clinic lobby loudly crying, asking for writer.      Per patient's previous SANJANA, writer helped patient call her former (services discontinued earlier this month due to no contact from patient) [facility] ([facility]?) [facility] [place] ([phone], cell [phone]), who confirmed that patient was discharged from her new group home [facility] on 5/12/18 following a series of infractions mostly having to do with drug relapse.  Carol Ann is uncertain whether patient's former group home [facility] in [place] ([place] [phone], cell [phone]) still has patient's furniture.  Writer attempted to assist patient to identify medical bed at [facility] and to utilize the Crisis Stabilization team and get to a Rule 25, but patient unwilling to discuss same, insisting that she needed to stay in the hospital.  She moved to office floor, sobbing.    Because of patient's suicidal threats, limited problem-solving abilities and her impulsivity, and her refusal to leave the clinic or make a plan to go to [facility] shelter, writer obtained consultation from clinic nurse manager Tracy Hamilton and psychotherapist manager Caio Gauthier.  Efforts were initially made to get her into a crisis residence and psychiatric consult was obtained from patient's new psychiatric provider Dottie Felton to re-up medications, but due to patient's lapsed MA, crisis residence not an option.  Patient ultimately admitted by Dottie Felton to inpatient MH due to vulnerability and risk of impulsive self-harm.    Type of psychotherapeutic technique  provided: Client centered and Solution-focused    Progress toward short term goals:Poor progress, relapse on methamphetamine and cocaine, loss of all  (group home, [facility] )    Review of long term goals: Not done at today's visit and Date of last review 2/8/18    Diagnosis:   1. Amphetamine and psychostimulant-induced mood disorder    2. Episodic mood disorder    3. Borderline personality disorder    4. Polysubstance dependence    5. Intellectual disability        Plan and Follow up:  Stabilization on inpatient unit per Dottie Felton's assessment.  Patient needs to be seen by financial SW to re-instate MA. She should complete Rule 25 ASAP with plan for residential CD treatment or group home where patient can stabilize her situation. Patient should re-apply for [facility] services ([facility] or [facility]?) to re-instate waivered services.  Patient can return to this clinic for psychotherapy once her living situation is stabilized - but writer does not believe she has the faculties to organize herself to get to care on her own.     Discharge Criteria/Planning: Client has chronic symptoms and ongoing therapy for maintenance stability recommended.    Tameka WATSON, LICSW  5/16/2018

## (undated) DEVICE — LINEN HALF SHEET 5512

## (undated) DEVICE — DRSG BANDAID 1X3" FABRIC CURITY LATEX FREE KC44101

## (undated) DEVICE — SU VICRYL 4-0 PS-2 18" UND J496H

## (undated) DEVICE — SOL ADH LIQUID BENZOIN SWAB 0.6ML C1544

## (undated) DEVICE — BLADE KNIFE SURG 11 371111

## (undated) DEVICE — ENDO CANNULA 05MM VERSAONE UNIVERSAL UNVCA5STF

## (undated) DEVICE — ESU GROUND PAD ADULT W/CORD E7507

## (undated) DEVICE — GLOVE PROTEXIS W/NEU-THERA 7.5  2D73TE75

## (undated) DEVICE — LINEN TOWEL PACK X5 5464

## (undated) DEVICE — BAG CLEAR TRASH 1.3M 39X33" P4040C

## (undated) DEVICE — STPL ENDO GIA 12MM UNIV 030449

## (undated) DEVICE — ENDO TROCAR BLUNT 100MM W/THRD ANCHOR BLUNTPORT BPT12STS

## (undated) DEVICE — LINEN TOWEL PACK X10 5473

## (undated) DEVICE — PREP CHLORAPREP 26ML TINTED ORANGE  260815

## (undated) DEVICE — ENDO POUCH GOLD 10MM ECATCH 173050G

## (undated) DEVICE — ESU CORD MONOPOLAR 10'  E0510

## (undated) DEVICE — ESU PENCIL W/HOLSTER E2350H

## (undated) DEVICE — Device

## (undated) DEVICE — ESU ELEC BLADE 2.75" COATED/INSULATED E1455

## (undated) DEVICE — SU VICRYL 0 UR-6 27" J603H

## (undated) DEVICE — LINEN POUCH DBL 5427

## (undated) DEVICE — DECANTER VIAL 2006S

## (undated) DEVICE — ENDO TROCAR 05MM VERSAONE BLADELESS W/STD FIX CAN NONB5STF

## (undated) DEVICE — GLOVE PROTEXIS BLUE W/NEU-THERA 7.5  2D73EB75

## (undated) DEVICE — SOL NACL 0.9% IRRIG 1000ML BOTTLE 2F7124

## (undated) DEVICE — LINEN FULL SHEET 5511

## (undated) RX ORDER — FENTANYL CITRATE 50 UG/ML
INJECTION, SOLUTION INTRAMUSCULAR; INTRAVENOUS
Status: DISPENSED
Start: 2017-03-17

## (undated) RX ORDER — ERTAPENEM 1 G/1
INJECTION, POWDER, LYOPHILIZED, FOR SOLUTION INTRAMUSCULAR; INTRAVENOUS
Status: DISPENSED
Start: 2017-03-17

## (undated) RX ORDER — BUPIVACAINE HYDROCHLORIDE AND EPINEPHRINE 5; 5 MG/ML; UG/ML
INJECTION, SOLUTION EPIDURAL; INTRACAUDAL; PERINEURAL
Status: DISPENSED
Start: 2017-03-17

## (undated) RX ORDER — ONDANSETRON 2 MG/ML
INJECTION INTRAMUSCULAR; INTRAVENOUS
Status: DISPENSED
Start: 2017-03-17

## (undated) RX ORDER — NEOSTIGMINE METHYLSULFATE 5 MG/5 ML
SYRINGE (ML) INTRAVENOUS
Status: DISPENSED
Start: 2017-03-17

## (undated) RX ORDER — GLYCOPYRROLATE 0.2 MG/ML
INJECTION INTRAMUSCULAR; INTRAVENOUS
Status: DISPENSED
Start: 2017-03-17

## (undated) RX ORDER — PROPOFOL 10 MG/ML
INJECTION, EMULSION INTRAVENOUS
Status: DISPENSED
Start: 2017-03-17

## (undated) RX ORDER — DEXAMETHASONE SODIUM PHOSPHATE 4 MG/ML
INJECTION, SOLUTION INTRA-ARTICULAR; INTRALESIONAL; INTRAMUSCULAR; INTRAVENOUS; SOFT TISSUE
Status: DISPENSED
Start: 2017-03-17

## (undated) RX ORDER — LABETALOL HYDROCHLORIDE 5 MG/ML
INJECTION, SOLUTION INTRAVENOUS
Status: DISPENSED
Start: 2017-03-17